# Patient Record
Sex: FEMALE | Race: WHITE | URBAN - METROPOLITAN AREA
[De-identification: names, ages, dates, MRNs, and addresses within clinical notes are randomized per-mention and may not be internally consistent; named-entity substitution may affect disease eponyms.]

---

## 2017-03-19 ENCOUNTER — APPOINTMENT (OUTPATIENT)
Dept: CT IMAGING | Facility: CLINIC | Age: 82
DRG: 760 | End: 2017-03-19
Attending: EMERGENCY MEDICINE
Payer: MEDICARE

## 2017-03-19 ENCOUNTER — HOSPITAL ENCOUNTER (INPATIENT)
Facility: CLINIC | Age: 82
LOS: 4 days | Discharge: HOME HEALTH | DRG: 760 | End: 2017-03-23
Attending: EMERGENCY MEDICINE | Admitting: INTERNAL MEDICINE
Payer: MEDICARE

## 2017-03-19 DIAGNOSIS — K43.9 VENTRAL HERNIA WITHOUT OBSTRUCTION OR GANGRENE: ICD-10-CM

## 2017-03-19 DIAGNOSIS — R21 RASH: Primary | ICD-10-CM

## 2017-03-19 DIAGNOSIS — N83.8 OVARIAN MASS: ICD-10-CM

## 2017-03-19 DIAGNOSIS — R10.31 ABDOMINAL PAIN, RIGHT LOWER QUADRANT: ICD-10-CM

## 2017-03-19 DIAGNOSIS — N64.9 BREAST LESION: ICD-10-CM

## 2017-03-19 DIAGNOSIS — R18.8 OTHER ASCITES: ICD-10-CM

## 2017-03-19 DIAGNOSIS — N63.0 BREAST MASS: ICD-10-CM

## 2017-03-19 DIAGNOSIS — N63.10 BREAST MASS, RIGHT: ICD-10-CM

## 2017-03-19 PROBLEM — R10.9 ABDOMINAL PAIN: Status: ACTIVE | Noted: 2017-03-19

## 2017-03-19 LAB
ALBUMIN SERPL-MCNC: 3.1 G/DL (ref 3.4–5)
ALBUMIN SERPL-MCNC: 3.3 G/DL (ref 3.4–5)
ALBUMIN UR-MCNC: 30 MG/DL
ALP SERPL-CCNC: 34 U/L (ref 40–150)
ALT SERPL W P-5'-P-CCNC: 13 U/L (ref 0–50)
ANION GAP SERPL CALCULATED.3IONS-SCNC: 10 MMOL/L (ref 3–14)
APPEARANCE UR: CLEAR
AST SERPL W P-5'-P-CCNC: 35 U/L (ref 0–45)
BASOPHILS # BLD AUTO: 0 10E9/L (ref 0–0.2)
BASOPHILS NFR BLD AUTO: 0 %
BILIRUB SERPL-MCNC: 0.8 MG/DL (ref 0.2–1.3)
BILIRUB UR QL STRIP: NEGATIVE
BUN SERPL-MCNC: 19 MG/DL (ref 7–30)
CALCIUM SERPL-MCNC: 9.6 MG/DL (ref 8.5–10.1)
CANCER AG125 SERPL-ACNC: 95 U/ML (ref 0–30)
CANCER AG27-29 SERPL-ACNC: 297 U/ML (ref 0–39)
CHLORIDE SERPL-SCNC: 103 MMOL/L (ref 94–109)
CO2 SERPL-SCNC: 27 MMOL/L (ref 20–32)
COLOR UR AUTO: YELLOW
CREAT SERPL-MCNC: 0.9 MG/DL (ref 0.52–1.04)
DIFFERENTIAL METHOD BLD: ABNORMAL
EOSINOPHIL # BLD AUTO: 0 10E9/L (ref 0–0.7)
EOSINOPHIL NFR BLD AUTO: 0 %
ERYTHROCYTE [DISTWIDTH] IN BLOOD BY AUTOMATED COUNT: 14.7 % (ref 10–15)
GFR SERPL CREATININE-BSD FRML MDRD: 60 ML/MIN/1.7M2
GLUCOSE SERPL-MCNC: 177 MG/DL (ref 70–99)
GLUCOSE UR STRIP-MCNC: NEGATIVE MG/DL
HCT VFR BLD AUTO: 40.2 % (ref 35–47)
HGB BLD-MCNC: 13.2 G/DL (ref 11.7–15.7)
HGB UR QL STRIP: NEGATIVE
IMM GRANULOCYTES # BLD: 0.1 10E9/L (ref 0–0.4)
IMM GRANULOCYTES NFR BLD: 0.3 %
KETONES UR STRIP-MCNC: 5 MG/DL
LEUKOCYTE ESTERASE UR QL STRIP: NEGATIVE
LIPASE SERPL-CCNC: 51 U/L (ref 73–393)
LYMPHOCYTES # BLD AUTO: 0.4 10E9/L (ref 0.8–5.3)
LYMPHOCYTES NFR BLD AUTO: 3 %
MCH RBC QN AUTO: 30.6 PG (ref 26.5–33)
MCHC RBC AUTO-ENTMCNC: 32.8 G/DL (ref 31.5–36.5)
MCV RBC AUTO: 93 FL (ref 78–100)
MONOCYTES # BLD AUTO: 0.2 10E9/L (ref 0–1.3)
MONOCYTES NFR BLD AUTO: 1.4 %
MUCOUS THREADS #/AREA URNS LPF: PRESENT /LPF
NEUTROPHILS # BLD AUTO: 13.7 10E9/L (ref 1.6–8.3)
NEUTROPHILS NFR BLD AUTO: 95.3 %
NITRATE UR QL: NEGATIVE
NRBC # BLD AUTO: 0 10*3/UL
NRBC BLD AUTO-RTO: 0 /100
PH UR STRIP: 6.5 PH (ref 5–7)
PLATELET # BLD AUTO: 239 10E9/L (ref 150–450)
POTASSIUM SERPL-SCNC: 3.7 MMOL/L (ref 3.4–5.3)
PROT SERPL-MCNC: 6.1 G/DL (ref 6.8–8.8)
RBC # BLD AUTO: 4.31 10E12/L (ref 3.8–5.2)
RBC #/AREA URNS AUTO: 2 /HPF (ref 0–2)
SODIUM SERPL-SCNC: 140 MMOL/L (ref 133–144)
SP GR UR STRIP: 1.04 (ref 1–1.03)
URN SPEC COLLECT METH UR: ABNORMAL
UROBILINOGEN UR STRIP-MCNC: NORMAL MG/DL (ref 0–2)
WBC # BLD AUTO: 14.4 10E9/L (ref 4–11)
WBC #/AREA URNS AUTO: 2 /HPF (ref 0–2)

## 2017-03-19 PROCEDURE — 96374 THER/PROPH/DIAG INJ IV PUSH: CPT

## 2017-03-19 PROCEDURE — 25000125 ZZHC RX 250: Performed by: EMERGENCY MEDICINE

## 2017-03-19 PROCEDURE — 25000128 H RX IP 250 OP 636: Performed by: INTERNAL MEDICINE

## 2017-03-19 PROCEDURE — 74177 CT ABD & PELVIS W/CONTRAST: CPT

## 2017-03-19 PROCEDURE — 25500064 ZZH RX 255 OP 636: Performed by: EMERGENCY MEDICINE

## 2017-03-19 PROCEDURE — 85025 COMPLETE CBC W/AUTO DIFF WBC: CPT | Performed by: EMERGENCY MEDICINE

## 2017-03-19 PROCEDURE — 82040 ASSAY OF SERUM ALBUMIN: CPT | Performed by: INTERNAL MEDICINE

## 2017-03-19 PROCEDURE — 99221 1ST HOSP IP/OBS SF/LOW 40: CPT | Mod: AI | Performed by: INTERNAL MEDICINE

## 2017-03-19 PROCEDURE — 12000007 ZZH R&B INTERMEDIATE

## 2017-03-19 PROCEDURE — 25000132 ZZH RX MED GY IP 250 OP 250 PS 637: Mod: GY | Performed by: INTERNAL MEDICINE

## 2017-03-19 PROCEDURE — 81001 URINALYSIS AUTO W/SCOPE: CPT | Performed by: INTERNAL MEDICINE

## 2017-03-19 PROCEDURE — 99285 EMERGENCY DEPT VISIT HI MDM: CPT | Mod: 25

## 2017-03-19 PROCEDURE — 36415 COLL VENOUS BLD VENIPUNCTURE: CPT | Performed by: INTERNAL MEDICINE

## 2017-03-19 PROCEDURE — 99207 ZZC CDG-HISTORY COMPONENT: MEETS DETAILED - DOWN CODED LACK OF PFSH: CPT | Performed by: INTERNAL MEDICINE

## 2017-03-19 PROCEDURE — 80053 COMPREHEN METABOLIC PANEL: CPT | Performed by: EMERGENCY MEDICINE

## 2017-03-19 PROCEDURE — 86300 IMMUNOASSAY TUMOR CA 15-3: CPT | Performed by: INTERNAL MEDICINE

## 2017-03-19 PROCEDURE — 83690 ASSAY OF LIPASE: CPT | Performed by: EMERGENCY MEDICINE

## 2017-03-19 PROCEDURE — 25000128 H RX IP 250 OP 636: Performed by: EMERGENCY MEDICINE

## 2017-03-19 PROCEDURE — 86304 IMMUNOASSAY TUMOR CA 125: CPT | Performed by: INTERNAL MEDICINE

## 2017-03-19 PROCEDURE — 96361 HYDRATE IV INFUSION ADD-ON: CPT

## 2017-03-19 PROCEDURE — 96375 TX/PRO/DX INJ NEW DRUG ADDON: CPT

## 2017-03-19 RX ORDER — IOPAMIDOL 755 MG/ML
121 INJECTION, SOLUTION INTRAVASCULAR ONCE
Status: COMPLETED | OUTPATIENT
Start: 2017-03-19 | End: 2017-03-19

## 2017-03-19 RX ORDER — HYDROCODONE BITARTRATE AND ACETAMINOPHEN 5; 325 MG/1; MG/1
1-2 TABLET ORAL EVERY 4 HOURS PRN
Status: DISCONTINUED | OUTPATIENT
Start: 2017-03-19 | End: 2017-03-23 | Stop reason: HOSPADM

## 2017-03-19 RX ORDER — FUROSEMIDE 10 MG/ML
20 INJECTION INTRAMUSCULAR; INTRAVENOUS ONCE
Status: COMPLETED | OUTPATIENT
Start: 2017-03-19 | End: 2017-03-19

## 2017-03-19 RX ORDER — ONDANSETRON 4 MG/1
4 TABLET, ORALLY DISINTEGRATING ORAL EVERY 6 HOURS PRN
Status: DISCONTINUED | OUTPATIENT
Start: 2017-03-19 | End: 2017-03-23 | Stop reason: HOSPADM

## 2017-03-19 RX ORDER — MULTIPLE VITAMINS W/ MINERALS TAB 9MG-400MCG
1 TAB ORAL DAILY
COMMUNITY

## 2017-03-19 RX ORDER — PROCHLORPERAZINE MALEATE 5 MG
5 TABLET ORAL EVERY 6 HOURS PRN
Status: DISCONTINUED | OUTPATIENT
Start: 2017-03-19 | End: 2017-03-23 | Stop reason: HOSPADM

## 2017-03-19 RX ORDER — ALBUMIN (HUMAN) 12.5 G/50ML
12.5 SOLUTION INTRAVENOUS ONCE
Status: DISCONTINUED | OUTPATIENT
Start: 2017-03-19 | End: 2017-03-19 | Stop reason: CLARIF

## 2017-03-19 RX ORDER — ACETAMINOPHEN 325 MG/1
650 TABLET ORAL EVERY 4 HOURS PRN
Status: DISCONTINUED | OUTPATIENT
Start: 2017-03-19 | End: 2017-03-23 | Stop reason: HOSPADM

## 2017-03-19 RX ORDER — PROCHLORPERAZINE 25 MG
12.5 SUPPOSITORY, RECTAL RECTAL EVERY 12 HOURS PRN
Status: DISCONTINUED | OUTPATIENT
Start: 2017-03-19 | End: 2017-03-23 | Stop reason: HOSPADM

## 2017-03-19 RX ORDER — HYDROMORPHONE HYDROCHLORIDE 1 MG/ML
.3-.5 INJECTION, SOLUTION INTRAMUSCULAR; INTRAVENOUS; SUBCUTANEOUS
Status: DISCONTINUED | OUTPATIENT
Start: 2017-03-19 | End: 2017-03-23 | Stop reason: HOSPADM

## 2017-03-19 RX ORDER — SODIUM CHLORIDE 9 MG/ML
1000 INJECTION, SOLUTION INTRAVENOUS CONTINUOUS
Status: DISCONTINUED | OUTPATIENT
Start: 2017-03-19 | End: 2017-03-19

## 2017-03-19 RX ORDER — ALBUMIN (HUMAN) 12.5 G/50ML
12.5 SOLUTION INTRAVENOUS ONCE
Status: CANCELLED | OUTPATIENT
Start: 2017-03-19 | End: 2017-03-19

## 2017-03-19 RX ORDER — ONDANSETRON 2 MG/ML
4 INJECTION INTRAMUSCULAR; INTRAVENOUS EVERY 6 HOURS PRN
Status: DISCONTINUED | OUTPATIENT
Start: 2017-03-19 | End: 2017-03-23 | Stop reason: HOSPADM

## 2017-03-19 RX ORDER — ONDANSETRON 2 MG/ML
4 INJECTION INTRAMUSCULAR; INTRAVENOUS ONCE
Status: COMPLETED | OUTPATIENT
Start: 2017-03-19 | End: 2017-03-19

## 2017-03-19 RX ORDER — LIDOCAINE 40 MG/G
CREAM TOPICAL
Status: DISCONTINUED | OUTPATIENT
Start: 2017-03-19 | End: 2017-03-19

## 2017-03-19 RX ORDER — NALOXONE HYDROCHLORIDE 0.4 MG/ML
.1-.4 INJECTION, SOLUTION INTRAMUSCULAR; INTRAVENOUS; SUBCUTANEOUS
Status: DISCONTINUED | OUTPATIENT
Start: 2017-03-19 | End: 2017-03-23 | Stop reason: HOSPADM

## 2017-03-19 RX ORDER — HYDROMORPHONE HYDROCHLORIDE 1 MG/ML
0.2 INJECTION, SOLUTION INTRAMUSCULAR; INTRAVENOUS; SUBCUTANEOUS
Status: COMPLETED | OUTPATIENT
Start: 2017-03-19 | End: 2017-03-19

## 2017-03-19 RX ADMIN — IOPAMIDOL 121 ML: 755 INJECTION, SOLUTION INTRAVENOUS at 12:45

## 2017-03-19 RX ADMIN — SODIUM CHLORIDE 77 ML: 9 INJECTION, SOLUTION INTRAVENOUS at 12:46

## 2017-03-19 RX ADMIN — FUROSEMIDE 20 MG: 10 INJECTION, SOLUTION INTRAVENOUS at 17:51

## 2017-03-19 RX ADMIN — MICONAZOLE NITRATE: 2 POWDER TOPICAL at 23:10

## 2017-03-19 RX ADMIN — ONDANSETRON 4 MG: 2 INJECTION INTRAMUSCULAR; INTRAVENOUS at 11:20

## 2017-03-19 RX ADMIN — SODIUM CHLORIDE 1000 ML: 9 INJECTION, SOLUTION INTRAVENOUS at 11:05

## 2017-03-19 RX ADMIN — HYDROMORPHONE HYDROCHLORIDE 0.2 MG: 1 INJECTION, SOLUTION INTRAMUSCULAR; INTRAVENOUS; SUBCUTANEOUS at 11:14

## 2017-03-19 ASSESSMENT — ACTIVITIES OF DAILY LIVING (ADL)
FALL_HISTORY_WITHIN_LAST_SIX_MONTHS: YES
TOILETING: 0-->INDEPENDENT
BATHING: 0-->INDEPENDENT
TRANSFERRING: 0-->INDEPENDENT
COGNITION: 0 - NO COGNITION ISSUES REPORTED
RETIRED_COMMUNICATION: 0-->UNDERSTANDS/COMMUNICATES WITHOUT DIFFICULTY
RETIRED_EATING: 0-->INDEPENDENT
SWALLOWING: 0-->SWALLOWS FOODS/LIQUIDS WITHOUT DIFFICULTY
DRESS: 0-->INDEPENDENT
AMBULATION: 1-->ASSISTIVE EQUIPMENT
NUMBER_OF_TIMES_PATIENT_HAS_FALLEN_WITHIN_LAST_SIX_MONTHS: 1
WHICH_OF_THE_ABOVE_FUNCTIONAL_RISKS_HAD_A_RECENT_ONSET_OR_CHANGE?: FALL HISTORY

## 2017-03-19 ASSESSMENT — ENCOUNTER SYMPTOMS
APPETITE CHANGE: 1
NAUSEA: 0
DIARRHEA: 1
SHORTNESS OF BREATH: 0
ABDOMINAL PAIN: 1
BLOOD IN STOOL: 0
VOMITING: 1

## 2017-03-19 NOTE — ED NOTES
Bed: ED05  Expected date:   Expected time:   Means of arrival:   Comments:  416  86 F abd pain/distention  1039

## 2017-03-19 NOTE — H&P
DATE OF ADMISSION:  03/19/2017      CHIEF COMPLAINT:  Abdominal pain.      HISTORY OF PRESENT ILLNESS:  Jovita Marshall is an 82-year-old white female with a history of hypertension, hypothyroidism, hyperlipidemia who presents with abdominal pain.  The patient states that she has noticed increased abdominal distention as well as lower extremity edema since January after her hip surgery.  However, last night the patient had gas pains in her abdomen which were quite severe.  She tried taking antacids accompanying the gas pains which were diffuse in nature.  She had nausea as well as had emesis x2 which was mainly of food particles.  She also had some loose stools twice which were nonbloody, however, her pain was persistent and hence she presented to the ER for further evaluation.  In the ER over here, the patient denies any chest pain.  She denies any shortness of breath.  She does endorse some lightheadedness.  Denies any fevers or chills.  She was seen by Dr. Cleveland and she had an elevated white cell count which prompted a CT of the abdomen and pelvis, which showed a large complex pelvic mass measuring 15 cm in greatest diameter.  The mass is primarily cystic but does have some lipid component and some calcifications.  It could be a large dermoid cyst, however, there is a large amount of ascites and based on the size, it is worrisome for an ovarian neoplasm.  I am asked to admit her for further evaluation.      PAST MEDICAL HISTORY:  Significant for hypertension, hyperlipidemia, hypothyroidism.      PAST SURGICAL HISTORY:  Significant for hysterectomy, preserving her ovaries in 1959.  She does have a history of breast cancer with right lumpectomy and radiation treatment, most recent mammogram was in 11/2016, the results of which are unknown.      FAMILY HISTORY:  Her mother had Dong's disease.      OUTPATIENT MEDICATIONS: Lasix, chlorthalidone, atenolol, Synthroid, and Zocor.      ALLERGIES:  No known drug allergies.       REVIEW OF SYSTEMS:  As mentioned in the HPI.  All other systems are extensively reviewed and deemed unremarkable and negative.      PHYSICAL EXAMINATION:   VITAL SIGNS:  Temperature is 97.9, respiratory rate is 16.  Blood pressure is 146/82.  O2 sat is 93% on room air.   GENERAL:  She is alert, awake, oriented, coherent, nontoxic, in no acute distress.  She is accompanied by her daughter.   HEENT:  Pupils equal, round, reactive to light.  Pharynx there is no exudate noted.  There is no tender anterior cervical lymphadenopathy.   LUNGS:  She has a few crackles in her bases bilaterally.   HEART:  Regular rate, S1, S2 normal.  She has a 3/6 systolic murmur.   ABDOMEN:  Soft, protuberant, diffusely tender, no guarding, no rigidity, with good bowel sounds.  She has an umbilical hernia in her epigastric region which is easily reducible.   EXTREMITIES:  She has 3+ edema bilaterally.   NEUROLOGIC:  Cranial nerves II-XII are grossly within normal limits.  She moves all her extremities.      LABORATORY DATA:  Labwork here shows a sodium 140, potassium 3.7, chloride 103, bicarbonate 27, BUN 19, creatinine 0.90, GFR 60, calcium 9.6, anion gap 10, albumin 3.1, total protein 6.1, total bilirubin 0.8.  Alkaline phosphatase 34.  LFTs are within normal limits.  Lipase is 51.  Glucose level of 177.  On a CBC:  White cell count is 14.4 with an absolute neutrophil count of 13.7.      IMAGING:  She had the following imaging studies:  CT of abdomen and pelvis with contrast which showed a large complex pelvic mass measuring 15 cm in greatest diameter.  The mass is primarily cystic but does have some lipid component and some calcifications.  This could be simply a large dermoid cyst, however, there is a large amount of ascites and based on the size of the mass the finding is worrisome for ovarian neoplasm stellate 2 cm lesion in the retroareolar region of the right breast, which is indeterminate, breast neoplasm cannot be excluded.   Diagnostic mammogram and ultrasound recommended for further evaluation.     Ventral hernia above the level of the umbilicus containing some omentum and some soft tissue thickening which could represent some focal inflammatory change.  There are low density lesions in both kidneys which are too small to characterize, most likely simple cyst.      ASSESSMENT AND PLAN:   1.  Abdominal pain, likely due to large complex pelvic mass.  Will admit her as an inpatient.  Will consult Interventional Radiology for paracentesis and send off the ascitic fluid for cytology.  In addition, will check tumor markers such as .  Will consult Oncology.   2.  Breast lesion on the right as on CT scan.  Will consult Oncology given her history of breast cancer.  Will hold off on further diagnostic at this point in time.    3.  Will place her on PCDs for DVT prophylaxis.      CODE STATUS:  Full code.      She will be admitted as an inpatient.         MATTHEW PEPE MD             D: 2017 15:15   T: 2017 15:46   MT: JOSE C      Name:     KATINA SORTO   MRN:      [id number]        Account:      [id number]   :      1934           Admitted:     [id number]      Document: [id number]

## 2017-03-19 NOTE — IP AVS SNAPSHOT
MRN:[id number]                      After Visit Summary   3/19/2017    Jovita Marshall    MRN: [id number]           Thank you!     Thank you for choosing [facility] for your care. Our goal is always to provide you with excellent care. Hearing back from our patients is one way we can continue to improve our services. Please take a few minutes to complete the written survey that you may receive in the mail after you visit with us. Thank you!        Patient Information     Date Of Birth          5/9/1934        About your hospital stay     You were admitted on:  March 19, 2017 You last received care in the:  [address] [facility]    You were discharged on:  March 23, 2017        Reason for your hospital stay       Abdominal pian                  Who to Call     For medical emergencies, please call 911.  For non-urgent questions about your medical care, please call your primary care provider or clinic, [phone]          Attending Provider     Provider Specialty    Pam Cleveland MD Emergency Medicine    ParpHeather damico MD Internal Medicine       Primary Care Provider Office Phone # Fax #    [facility] [phone]       [address]  [facility] [address]        After Care Instructions     Activity       Your activity upon discharge: activity as tolerated            Diet       Follow this diet upon discharge: Orders Placed This Encounter      2 Gram Sodium Diet                  Follow-up Appointments     Follow-up and recommended labs and tests        Follow up with primary care provider, [facility], within 7 days regarding new diagnosis.  No follow up labs or test are needed.  F/U with Gyn/Onc team a scheduled                  Your next 10 appointments already scheduled     Mar 30, 2017   Procedure with Vanessa Ruiz MD   [facility] (--)    [address]  [facility]  "[phone]   [phone]              Additional Services     Home Care PT Referral for Hospital Discharge       PT to eval and treat    Your provider has ordered home care - physical therapy. If you have not been contacted within 2 days of your discharge please call the department phone number listed on the top of this document.            Home care nursing referral       RN skilled nursing visit. RN to assess vital signs and weight and abdominal distension.    Your provider has ordered home care nursing services. If you have not been contacted within 2 days of your discharge please call the inpatient department phone number at [phone] .                  Further instructions from your care team       Abdominal pain    Home care arranged by:  [facility] [place]  Phone number #[phone]    Pending Results     Date and Time Order Name Status Description    3/19/2017 1644 Anaerobic bacterial culture Preliminary     3/19/2017 1644 Fluid Culture Aerobic Bacterial Preliminary             Statement of Approval     Ordered          03/23/17 1213  I have reviewed and agree with all the recommendations and orders detailed in this document.  EFFECTIVE NOW     Approved and electronically signed by:  Alex Amador MD             Admission Information     Date & Time Provider Department Dept. Phone    3/19/2017 Heather Bailey MD [place] [address] [facility] [phone]      Your Vitals Were     Blood Pressure Pulse Temperature Respirations Height Weight    142/93 (BP Location: Left arm) 91 97.7  F (36.5  C) (Oral) 14 1.549 m (5' 1\") 122 kg (268 lb 15.4 oz)    Pulse Oximetry BMI (Body Mass Index)                93% 50.82 kg/m2          [facility] Information     [facility] lets you send messages to your doctor, view your test results, renew your prescriptions, schedule appointments and more. To sign up, go to [email or web address] . Click on \"Log in\" on the left side of the screen, which will " "take you to the Welcome page. Then click on \"Sign up Now\" on the right side of the page.     You will be asked to enter the access code listed below, as well as some personal information. Please follow the directions to create your username and password.     Your access code is: [email or web address]  Expires: 2017 12:38 PM     Your access code will  in 90 days. If you need help or a new code, please call your [facility] clinic or [phone].        Care EveryWhere ID     This is your Care EveryWhere ID. This could be used by other organizations to access your [facility] medical records  [id number]           Review of your medicines      START taking        Dose / Directions    miconazole 2 % powder   Commonly known as:  MICATIN; MICRO GUARD   Used for:  Rash        Apply topically every hour as needed for other (topical candidiasis)   Quantity:  10 g   Refills:  0         CONTINUE these medicines which have NOT CHANGED        Dose / Directions    ATENOLOL PO        Dose:  50 mg   Take 50 mg by mouth every evening   Refills:  0       FUROSEMIDE PO        Dose:  20 mg   Take 20 mg by mouth 2 times daily Morning and Noon   Refills:  0       K-DUR PO        Dose:  20 mEq   Take 20 mEq by mouth daily   Refills:  0       LEVOTHYROXINE SODIUM PO        Dose:  75 mcg   Take 75 mcg by mouth daily   Refills:  0       Multi-vitamin Tabs tablet        Dose:  1 tablet   Take 1 tablet by mouth daily   Refills:  0       SIMVASTATIN PO        Dose:  20 mg   Take 20 mg by mouth every evening (Takes half of a 40mg tablet for a total of 20mg in the evening)   Refills:  0       TYLENOL PO        Dose:  1300 mg   Take 1,300 mg by mouth daily as needed for mild pain or fever   Refills:  0       VITAMIN D (CHOLECALCIFEROL) PO        Dose:  2000 Units   Take 2,000 Units by mouth daily   Refills:  0         STOP taking     ASPIRIN EC PO                Where to get your medicines      These medications were sent to [facility] [place] " - [place], [place] - [address]  [address] [place] [address]     Phone:  [phone]     miconazole 2 % powder                Protect others around you: Learn how to safely use, store and throw away your medicines at www.disposemymeds.org.             Medication List: This is a list of all your medications and when to take them. Check marks below indicate your daily home schedule. Keep this list as a reference.      Medications           Morning Afternoon Evening Bedtime As Needed    ATENOLOL PO   Take 50 mg by mouth every evening   Last time this was given:  None taken   Next Dose Due:  Resume                                FUROSEMIDE PO   Take 20 mg by mouth 2 times daily Morning and Noon   Last time this was given:  None   Next Dose Due:  Resume                                K-DUR PO   Take 20 mEq by mouth daily   Last time this was given:  None taken   Next Dose Due:  Resume                                LEVOTHYROXINE SODIUM PO   Take 75 mcg by mouth daily   Last time this was given:  75 mcg on 3/23/2017  8:49 AM   Next Dose Due:  Due Friday 8am                                miconazole 2 % powder   Commonly known as:  MICATIN; MICRO GUARD   Apply topically every hour as needed for other (topical candidiasis)   Last time this was given:  3/20/2017  1:10 PM   Next Dose Due:  Due anytime                                Multi-vitamin Tabs tablet   Take 1 tablet by mouth daily   Last time this was given:  None taken   Next Dose Due:  Resume                                SIMVASTATIN PO   Take 20 mg by mouth every evening (Takes half of a 40mg tablet for a total of 20mg in the evening)   Last time this was given:  20 mg on 3/22/2017  9:20 PM   Next Dose Due:  Due Thursday at bedtime                                TYLENOL PO   Take 1,300 mg by mouth daily as needed for mild pain or fever   Last time this was given:  None taken   Next Dose Due:  Resume                                VITAMIN D  (CHOLECALCIFEROL) PO   Take 2,000 Units by mouth daily   Last time this was given:  None taken   Next Dose Due:  Resume                                          More Information        Abdominal Pain  Abdominal pain is pain in the stomach or intestinal area. Everyone has this pain from time to time. In many cases it goes away on its own. But abdominal pain can sometimes be due to a serious problem, such as appendicitis. So it s important to know when to seek help.  Causes of abdominal pain  There are many possible causes of abdominal pain. Common causes in adults include:    Constipation, diarrhea, or gas    GERD (gastroesophageal reflux disease) movement of stomach acid into the esophagus, also known as acid reflux or heartburn    Peptic ulcer (a sore in the lining of the stomach or small intestine)    Inflammation of the gallbladder, liver, or pancreas    Gallstones or kidney stones    Appendicitis     Obstruction of the intestines     Hernia (bulging of an internal organ through a muscle or other tissue)    Urinary tract infections    In women, menstrual cramps, fibroids, or endometriosis of the uterus    Inflammation or infection of the intestines  Diagnosing the cause of abdominal pain  Your health care provider will examine you to help find the cause of your pain. If needed, tests will be ordered. Because abdominal pain has so many possible causes, it can be hard to discover the reason for the pain. Giving details about your pain can help. Be ready to tell your health care provider where and when you feel the pain and what makes it better or worse. Also mention whether you have other symptoms such as fever, tiredness, nausea, vomiting, or changes in bathroom habits.  Treating abdominal pain  Certain causes of pain, such as appendicitis or a bowel obstruction, need emergency treatment. Other problems can be treated with rest, fluids, or medications. Your health care provider can give you specific instructions  for treatment or self-care based on the cause of your pain.  If you have vomiting or diarrhea, sip water or other clear fluids. When you are ready to eat solid foods again, start with small amounts of easy-to-digest, low-fat foods, such as applesauce, toast, or crackers.   When to call the doctor  Call 911 or go to the hospital right away if you:    Can t pass stool and are vomiting    Are vomiting blood or have black, tarry diarrhea    Also have chest, neck, or shoulder pain    Feel like you are about to pass out    Have pain in your shoulder blades with nausea    Have sudden, excruciating abdominal pain    Have new, severe pain unlike any you have felt before    Have a belly that is rigid, hard, and tender to touch  Call your doctor if you have:    Pain for more than 5 days    Bloating for more than 2 days    Diarrhea for more than 5 days    Fever of 101 F (38.3 C) or higher    Pain that continues to worsen    Unexplained weight loss    Continued lack of appetite    Blood in the stool  How to prevent abdominal pain  Here are some tips to help prevent abdominal pain:    Eat smaller amounts of food at one time.    Avoid greasy, fried, or other high-fat foods.    Avoid foods that give you gas.    Exercise regularly.    Drink plenty of fluids.  To help prevent symptoms of gastroesophageal reflux disease (GERD):    Quit smoking.    Reduce alcohol and certain foods that increase stomach acid.     Lose excess weight.    Finish eating at least 2 hours before you go to bed or lie down.    Elevate the head of your bed.    1603-6802 [employer]. [address] [place] [address], [place] [address]. All rights reserved. This information is not intended as a substitute for professional medical care. Always follow your healthcare professional's instructions.

## 2017-03-19 NOTE — ED NOTES
[facility]  ED Nurse Handoff Report    ED Chief complaint: Abdominal Pain and Nausea, Vomiting, & Diarrhea      ED Diagnosis:   Final diagnoses:   Ovarian mass - Suspicious for neoplasm   Abdominal pain, right lower quadrant   Other ascites   Ventral hernia without obstruction or gangrene   Breast lesion - 2 cm stellate lesion on CT right breast       Code Status: Not addressed by ED physician    Allergies: No Known Allergies    Activity level:  Patient has not gotten out of bed while in ED.     Needed?: No    Isolation: No  Infection: Not Applicable    Bariatric?: No      Vital Signs:   Vitals:    03/19/17 1107 03/19/17 1108 03/19/17 1119 03/19/17 1145   BP: 146/82      Resp:  16     Temp:  97.9  F (36.6  C)     TempSrc:  Oral     SpO2: 99%  93% 93%       Cardiac Rhythm: ,        Pain level:      Is this patient confused?: No    Patient Report: Initial Complaint: Abdominal pain with nausea and vomiting.  Focused Assessment: Patient comes in with complaints of abdominal pain since last night. States nausea and vomiting since this morning. Has a history of a mass in her abdomen for the past few months.  CT scan shows new diagnosis of ovarian cancer.   Tests Performed: See EPIC  Abnormal Results: See EPIC  Treatments provided: See [facility]    Family Comments: At bedside    OBS brochure/video discussed/provided to patient: No    ED Medications:   Medications   lidocaine 1 % 1 mL (not administered)   lidocaine (LMX4) cream (not administered)   sodium chloride (PF) 0.9% PF flush 3 mL (not administered)   sodium chloride (PF) 0.9% PF flush 3 mL (3 mLs Intracatheter Not Given 3/19/17 1135)   0.9% sodium chloride BOLUS (0 mLs Intravenous Stopped 3/19/17 1239)     Followed by   0.9% sodium chloride infusion (not administered)   iohexol (OMNIPAQUE) solution 25 mL (25 mLs Oral Given 3/19/17 1154)   HYDROmorphone (PF) (DILAUDID) injection 0.2 mg (0.2 mg Intravenous Given 3/19/17 1114)   ondansetron  (ZOFRAN) injection 4 mg (4 mg Intravenous Given 3/19/17 1120)   sodium chloride 0.9 % for CT scan flush dose 77 mL (77 mLs Intravenous Given 3/19/17 1246)   iopamidol (ISOVUE-370) solution 121 mL (121 mLs Intravenous Given 3/19/17 1245)       Drips infusing?:  No      ED NURSE PHONE NUMBER: [phone]

## 2017-03-19 NOTE — PHARMACY-ADMISSION MEDICATION HISTORY
Admission medication history interview status for the 3/19/2017  admission is complete. See EPIC admission navigator for prior to admission medications     Medication history source reliability:Good    Actions taken by pharmacist (provider contacted, etc):Verified all medications with patient's records from [facility] CareEverywhere and written list on phone     Additional medication history information not noted on PTA med list :None    Medication reconciliation/reorder completed by provider prior to medication history? No    Time spent in this activity: 20 minutes    Prior to Admission medications    Medication Sig Last Dose Taking? Auth Provider   ATENOLOL PO Take 50 mg by mouth every evening 3/18/2017 at pm Yes Unknown, Entered By History   VITAMIN D, CHOLECALCIFEROL, PO Take 2,000 Units by mouth daily 3/18/2017 at Unknown time Yes Unknown, Entered By History   multivitamin, therapeutic with minerals (MULTI-VITAMIN) TABS tablet Take 1 tablet by mouth daily 3/18/2017 at Unknown time Yes Unknown, Entered By History   SIMVASTATIN PO Take 20 mg by mouth every evening (Takes half of a 40mg tablet for a total of 20mg in the evening) 3/18/2017 at pm Yes Unknown, Entered By History   Potassium Chloride Obdulia CR (K-DUR PO) Take 20 mEq by mouth daily 3/18/2017 at am Yes Unknown, Entered By History   LEVOTHYROXINE SODIUM PO Take 75 mcg by mouth daily 3/18/2017 at am Yes Unknown, Entered By History   FUROSEMIDE PO Take 20 mg by mouth 2 times daily Morning and Noon 3/18/2017 at 1200 Yes Unknown, Entered By History   ASPIRIN EC PO Take 162 mg by mouth daily 3/18/2017 at am Yes Unknown, Entered By History   Acetaminophen (TYLENOL PO) Take 1,300 mg by mouth daily as needed for mild pain or fever 3/17/2017 Yes Unknown, Entered By History

## 2017-03-19 NOTE — IP AVS SNAPSHOT
[address] [facility]    [address] [place] [address]    Phone:  [phone]                                       After Visit Summary   3/19/2017    Jovita Marshall    MRN: [id number]           After Visit Summary Signature Page     I have received my discharge instructions, and my questions have been answered. I have discussed any challenges I see with this plan with the nurse or doctor.    ..........................................................................................................................................  Patient/Patient Representative Signature      ..........................................................................................................................................  Patient Representative Print Name and Relationship to Patient    ..................................................               ................................................  Date                                            Time    ..........................................................................................................................................  Reviewed by Signature/Title    ...................................................              ..............................................  Date                                                            Time

## 2017-03-19 NOTE — ED PROVIDER NOTES
"  History     Chief Complaint:  Abdominal pain     HPI   Information supplemented by EMS.     Jovita Marshall is a 82 year old female who presents with abdominal pain via EMS. The patient has had a mass in her left lower quadrant for the past number of months. Beginning last night at 2300, she describes experiencing \"gas pain\" in her lower left abdomen. Since this time, she has had three episodes of vomiting as well as persistent abdominal pain, which is alleviated when supine. The patient has not eaten anything today, but did take Pepto bismol. Her daughter called EMS today due to the persistence of the patient's pain. Upon evaluation, the patient reports multiple episodes of diarrhea. She denies any nausea, chest pain, shortness of breath, or any blood in her stool. She denies a history of c.diff or any recent antibiotics.     Of note, the patient had a hip arthoplasty performed in January, after which she has had persistent bilateral lower extremity edema.     Allergies:  NKDA     Medications:    The patient is currently on no regular medications.     Past Medical History:    The patient denies any significant past medical history.    Past Surgical History:    The patient does not have any pertinent past surgical history.    Family History:    No past pertinent family history.    Social History:  Lives with daughter and .   Marital Status:       Review of Systems   Constitutional: Positive for appetite change.   Respiratory: Negative for shortness of breath.    Cardiovascular: Negative for chest pain.   Gastrointestinal: Positive for abdominal pain (left lower), diarrhea and vomiting. Negative for blood in stool and nausea.   All other systems reviewed and are negative.    Physical Exam     Patient Vitals for the past 24 hrs:   BP Temp Temp src Resp SpO2   03/19/17 1145 - - - - 93 %   03/19/17 1119 - - - - 93 %   03/19/17 1108 - 97.9  F (36.6  C) Oral 16 -   03/19/17 1107 146/82 - - - 99 %     Physical " Exam  Nursing note and vitals reviewed.  Constitutional:  Obese elderly  female.   HENT:   Head:   No evidence of facial or scalp trauma.  Nose:    Nose normal.   Mouth/Throat:  Mucosa is moist.  Eyes:    Conjunctivae are normal.      Pupils are equal, round, and reactive to light.      Right eye exhibits no discharge. Left eye exhibits no discharge.      No scleral icterus.   Cardiovascular:  Normal rate, regular rhythm.      Normal heart sounds and intact distal pulses.       No murmur heard. Chronic 2+ lower extremity edema.   Pulmonary/Chest:  Effort normal and breath sounds normal. No respiratory distress.     No wheezes. No rales. No chest wall tenderness. No stridor.   Abdominal:   Soft. No distension and no mass. Diffuse tenderness with guarding.      No rebound. No flank pain. Ventral hernia that is tender, but reducible.   Musculoskeletal:  Normal range of motion.      No edema and no tenderness.                                       Neck supple, no midline cervical tenderness.   Neurological:   Alert and oriented to person, place, and year.      No cranial nerve deficit.      Exhibits normal muscle tone. Coordination normal.      GCS eye subscore is 4. GCS verbal subscore is 5.      GCS motor subscore is 6.   Skin:    Skin is warm and dry. No rash noted. No diaphoresis.      No erythema. No pallor.   Psychiatric:   Behavior is normal. Judgment and thought content normal.     Emergency Department Course   Imaging:  Radiographic findings were communicated with the patient who voiced understanding of the findings.    CT abdomen/pelvis w/ contrast:  1. Large complex pelvic mass measuring 15 cm in greatest diameter. The  mass is primarily cystic but does have some lipid component and some  calcifications.. It could be simply a large dermoid cyst, however  there is a large amount of ascites and based on the size of the mass  the finding is worrisome for an ovarian neoplasm.  2.  stellate 2 cm lesion in  the retroareolar region of the right  breast which is in determinant. Breast neoplasm cannot be excluded.  Diagnostic mammogram and ultrasound recommended for further  evaluation.  3. Ventral hernia above the level of the umbilicus containing some  omentum and some soft tissue thickening which could represent some  focal inflammatory change.  4... There are low-density lesions in both kidneys which are too small  to characterize, most likely simple cysts. As per radiology.     Laboratory:  CBC: WBC: 14.4 (H), HGB: 13.2, PLT: 239  CMP: Glucose 177 (H), GFR estimate 60 (L), Albumin 3.1 (L), Protein total 6.1 (L), Alkphos 34 (L), o/w WNL (Creatinine: 0.90)    Lipase: 51    Interventions:  1105 NS 1L IV   1114 Dilaudid 0.2 mg IV  1120 Zofran 4 mg IV    Emergency Department Course:  Nursing notes and vitals reviewed.  I performed an exam of the patient as documented above.     IV inserted. Medicine administered as documented above.     Blood drawn. This was sent to the lab for further testing, results above.    The patient was sent for a abdomen/pelvis CT while in the emergency department, findings above.     1416 I reevaluated the patient and provided an update in regards to her ED course.  I discussed the patient's CT findings, and admission to oncology for further observation.     1516 I consulted with Dr. Bailey, hospitalist, who agreed to admit the patient.      Findings and plan explained to the Patient who consents to admission. Discussed the patient with Dr. Bailey, who will admit the patient to a oncology bed for further monitoring, evaluation, and treatment.    Impression & Plan      Medical Decision Making:  Jovita Marshall is a 82 year old female who presents with abdominal pain. Her WBC [place] elevated at [address] with a left shift. CMP was unremarkable. The patient was unable to give a urine sample initially. I did obtain a CT of the abdomen/pelvis which unfortunately reveals a large complex pelvic mass measuring 15  cm that is suspicious for a ovarian neoplasm. She also has a 2 cm stellate lesion behind her right nipple which could be concerning for cancer. She has a history of breast cancer and had a lumpectomy in the past. She has a lot of ascites. I went in and gave the patient the news of her findings, and will admit her to the Dr. Bailey. I will get oncology consultation. She is comfortable at this time. She did get 0.2 mg of dilaudid for her pain, which did help greatly. She will continue to get IV fluids, and I will admit her to oncology.    Diagnosis:    ICD-10-CM    1. Ovarian mass N83.9     Suspicious for neoplasm   2. Abdominal pain, right lower quadrant R10.31    3. Other ascites R18.8    4. Ventral hernia without obstruction or gangrene K43.9    5. Breast lesion N64.9     2 cm stellate lesion on CT right breast       Plan: Admit to Dr Bailey  , Oncology consult. She probably will need a follow up mammogram for the breast leasion.       I, Del Rivera, am serving as a scribe on 3/19/2017 at 11:00 AM to personally document services performed by Pam Cleveland MD based on my observations and the provider's statements to me.     Del Rivera  3/19/2017    EMERGENCY DEPARTMENT       Pam Cleveland MD  03/19/17 [id number]

## 2017-03-20 ENCOUNTER — APPOINTMENT (OUTPATIENT)
Dept: ULTRASOUND IMAGING | Facility: CLINIC | Age: 82
DRG: 760 | End: 2017-03-20
Attending: INTERNAL MEDICINE
Payer: MEDICARE

## 2017-03-20 ENCOUNTER — APPOINTMENT (OUTPATIENT)
Dept: ULTRASOUND IMAGING | Facility: CLINIC | Age: 82
DRG: 760 | End: 2017-03-20
Attending: NURSE PRACTITIONER
Payer: MEDICARE

## 2017-03-20 LAB
ALBUMIN FLD-MCNC: 2.3 G/DL
ALBUMIN SERPL-MCNC: 2.7 G/DL (ref 3.4–5)
ALP SERPL-CCNC: 38 U/L (ref 40–150)
ALT SERPL W P-5'-P-CCNC: 9 U/L (ref 0–50)
ANION GAP SERPL CALCULATED.3IONS-SCNC: 8 MMOL/L (ref 3–14)
APPEARANCE FLD: NORMAL
AST SERPL W P-5'-P-CCNC: 29 U/L (ref 0–45)
BASOPHILS # BLD AUTO: 0 10E9/L (ref 0–0.2)
BASOPHILS NFR BLD AUTO: 0.1 %
BILIRUB SERPL-MCNC: 0.6 MG/DL (ref 0.2–1.3)
BUN SERPL-MCNC: 25 MG/DL (ref 7–30)
CALCIUM SERPL-MCNC: 10.2 MG/DL (ref 8.5–10.1)
CHLORIDE SERPL-SCNC: 102 MMOL/L (ref 94–109)
CO2 SERPL-SCNC: 30 MMOL/L (ref 20–32)
COLOR FLD: NORMAL
CREAT SERPL-MCNC: 1.11 MG/DL (ref 0.52–1.04)
DIFFERENTIAL METHOD BLD: ABNORMAL
EOSINOPHIL # BLD AUTO: 0 10E9/L (ref 0–0.7)
EOSINOPHIL NFR BLD AUTO: 0 %
EOSINOPHIL NFR FLD MANUAL: 2 %
ERYTHROCYTE [DISTWIDTH] IN BLOOD BY AUTOMATED COUNT: 15.1 % (ref 10–15)
GFR SERPL CREATININE-BSD FRML MDRD: 47 ML/MIN/1.7M2
GLUCOSE SERPL-MCNC: 132 MG/DL (ref 70–99)
GRAM STN SPEC: NORMAL
HCT VFR BLD AUTO: 41.6 % (ref 35–47)
HGB BLD-MCNC: 13.6 G/DL (ref 11.7–15.7)
IMM GRANULOCYTES # BLD: 0.1 10E9/L (ref 0–0.4)
IMM GRANULOCYTES NFR BLD: 0.4 %
INR PPP: 1.29 (ref 0.86–1.14)
LYMPHOCYTES # BLD AUTO: 0.7 10E9/L (ref 0.8–5.3)
LYMPHOCYTES NFR BLD AUTO: 3.8 %
LYMPHOCYTES NFR FLD MANUAL: 2 %
MCH RBC QN AUTO: 30.2 PG (ref 26.5–33)
MCHC RBC AUTO-ENTMCNC: 32.7 G/DL (ref 31.5–36.5)
MCV RBC AUTO: 92 FL (ref 78–100)
MICRO REPORT STATUS: NORMAL
MONOCYTES # BLD AUTO: 0.5 10E9/L (ref 0–1.3)
MONOCYTES NFR BLD AUTO: 2.7 %
MONOS+MACROS NFR FLD MANUAL: 1 %
NEUTROPHILS # BLD AUTO: 17.8 10E9/L (ref 1.6–8.3)
NEUTROPHILS NFR BLD AUTO: 93 %
NEUTS BAND NFR FLD MANUAL: 95 %
NRBC # BLD AUTO: 0 10*3/UL
NRBC BLD AUTO-RTO: 0 /100
PLATELET # BLD AUTO: 260 10E9/L (ref 150–450)
POTASSIUM SERPL-SCNC: 4 MMOL/L (ref 3.4–5.3)
PROT FLD-MCNC: 3.7 G/DL
PROT SERPL-MCNC: 5.8 G/DL (ref 6.8–8.8)
RBC # BLD AUTO: 4.5 10E12/L (ref 3.8–5.2)
SODIUM SERPL-SCNC: 140 MMOL/L (ref 133–144)
SPECIMEN SOURCE FLD: NORMAL
SPECIMEN SOURCE: NORMAL
WBC # BLD AUTO: 19.1 10E9/L (ref 4–11)
WBC # FLD AUTO: NORMAL /UL

## 2017-03-20 PROCEDURE — 87205 SMEAR GRAM STAIN: CPT | Performed by: INTERNAL MEDICINE

## 2017-03-20 PROCEDURE — 25000132 ZZH RX MED GY IP 250 OP 250 PS 637: Mod: GY | Performed by: INTERNAL MEDICINE

## 2017-03-20 PROCEDURE — 80053 COMPREHEN METABOLIC PANEL: CPT | Performed by: INTERNAL MEDICINE

## 2017-03-20 PROCEDURE — 12000007 ZZH R&B INTERMEDIATE

## 2017-03-20 PROCEDURE — 88305 TISSUE EXAM BY PATHOLOGIST: CPT | Mod: 26 | Performed by: INTERNAL MEDICINE

## 2017-03-20 PROCEDURE — 00000155 ZZHCL STATISTIC H-CELL BLOCK W/STAIN: Performed by: INTERNAL MEDICINE

## 2017-03-20 PROCEDURE — 25000128 H RX IP 250 OP 636: Performed by: INTERNAL MEDICINE

## 2017-03-20 PROCEDURE — A9270 NON-COVERED ITEM OR SERVICE: HCPCS | Mod: GY | Performed by: INTERNAL MEDICINE

## 2017-03-20 PROCEDURE — 82042 OTHER SOURCE ALBUMIN QUAN EA: CPT | Performed by: INTERNAL MEDICINE

## 2017-03-20 PROCEDURE — 85610 PROTHROMBIN TIME: CPT | Performed by: INTERNAL MEDICINE

## 2017-03-20 PROCEDURE — 88305 TISSUE EXAM BY PATHOLOGIST: CPT | Performed by: INTERNAL MEDICINE

## 2017-03-20 PROCEDURE — 0W9G3ZZ DRAINAGE OF PERITONEAL CAVITY, PERCUTANEOUS APPROACH: ICD-10-PCS | Performed by: RADIOLOGY

## 2017-03-20 PROCEDURE — 36415 COLL VENOUS BLD VENIPUNCTURE: CPT | Performed by: INTERNAL MEDICINE

## 2017-03-20 PROCEDURE — 87070 CULTURE OTHR SPECIMN AEROBIC: CPT | Performed by: INTERNAL MEDICINE

## 2017-03-20 PROCEDURE — 85025 COMPLETE CBC W/AUTO DIFF WBC: CPT | Performed by: INTERNAL MEDICINE

## 2017-03-20 PROCEDURE — 99233 SBSQ HOSP IP/OBS HIGH 50: CPT | Performed by: INTERNAL MEDICINE

## 2017-03-20 PROCEDURE — 88112 CYTOPATH CELL ENHANCE TECH: CPT | Performed by: INTERNAL MEDICINE

## 2017-03-20 PROCEDURE — 87075 CULTR BACTERIA EXCEPT BLOOD: CPT | Performed by: INTERNAL MEDICINE

## 2017-03-20 PROCEDURE — 76830 TRANSVAGINAL US NON-OB: CPT

## 2017-03-20 PROCEDURE — 84157 ASSAY OF PROTEIN OTHER: CPT | Performed by: INTERNAL MEDICINE

## 2017-03-20 PROCEDURE — 89051 BODY FLUID CELL COUNT: CPT | Performed by: INTERNAL MEDICINE

## 2017-03-20 PROCEDURE — 40000863 ZZH STATISTIC RADIOLOGY XRAY, US, CT, MAR, NM

## 2017-03-20 PROCEDURE — 27210190 US PARACENTESIS

## 2017-03-20 PROCEDURE — 88112 CYTOPATH CELL ENHANCE TECH: CPT | Mod: 26 | Performed by: INTERNAL MEDICINE

## 2017-03-20 RX ORDER — CEFTRIAXONE 2 G/1
2 INJECTION, POWDER, FOR SOLUTION INTRAMUSCULAR; INTRAVENOUS EVERY 24 HOURS
Status: DISCONTINUED | OUTPATIENT
Start: 2017-03-21 | End: 2017-03-20

## 2017-03-20 RX ORDER — CEFTRIAXONE 2 G/1
2 INJECTION, POWDER, FOR SOLUTION INTRAMUSCULAR; INTRAVENOUS EVERY 24 HOURS
Status: DISCONTINUED | OUTPATIENT
Start: 2017-03-20 | End: 2017-03-23 | Stop reason: HOSPADM

## 2017-03-20 RX ORDER — LIDOCAINE HYDROCHLORIDE 10 MG/ML
10 INJECTION, SOLUTION EPIDURAL; INFILTRATION; INTRACAUDAL; PERINEURAL ONCE
Status: COMPLETED | OUTPATIENT
Start: 2017-03-20 | End: 2017-03-20

## 2017-03-20 RX ORDER — CEFTRIAXONE 1 G/1
1 INJECTION, POWDER, FOR SOLUTION INTRAMUSCULAR; INTRAVENOUS EVERY 24 HOURS
Status: DISCONTINUED | OUTPATIENT
Start: 2017-03-20 | End: 2017-03-20

## 2017-03-20 RX ORDER — SIMVASTATIN 20 MG
20 TABLET ORAL EVERY EVENING
Status: DISCONTINUED | OUTPATIENT
Start: 2017-03-20 | End: 2017-03-23 | Stop reason: HOSPADM

## 2017-03-20 RX ORDER — FUROSEMIDE 10 MG/ML
20 INJECTION INTRAMUSCULAR; INTRAVENOUS ONCE
Status: COMPLETED | OUTPATIENT
Start: 2017-03-20 | End: 2017-03-20

## 2017-03-20 RX ORDER — LEVOTHYROXINE SODIUM 75 UG/1
75 TABLET ORAL DAILY
Status: DISCONTINUED | OUTPATIENT
Start: 2017-03-20 | End: 2017-03-23 | Stop reason: HOSPADM

## 2017-03-20 RX ADMIN — CEFTRIAXONE 2 G: 2 INJECTION, POWDER, FOR SOLUTION INTRAMUSCULAR; INTRAVENOUS at 18:05

## 2017-03-20 RX ADMIN — FUROSEMIDE 20 MG: 10 INJECTION, SOLUTION INTRAVENOUS at 16:13

## 2017-03-20 RX ADMIN — LIDOCAINE HYDROCHLORIDE 100 MG: 10 INJECTION, SOLUTION EPIDURAL; INFILTRATION; INTRACAUDAL; PERINEURAL at 11:23

## 2017-03-20 RX ADMIN — SIMVASTATIN 20 MG: 20 TABLET, FILM COATED ORAL at 19:42

## 2017-03-20 RX ADMIN — MICONAZOLE NITRATE: 2 POWDER TOPICAL at 13:10

## 2017-03-20 RX ADMIN — LEVOTHYROXINE SODIUM 75 MCG: 75 TABLET ORAL at 09:45

## 2017-03-20 NOTE — PLAN OF CARE
Problem: Goal Outcome Summary  Goal: Goal Outcome Summary  Outcome: No Change  10:45 patient on cart to ultrasound

## 2017-03-20 NOTE — CONSULTS
GYN/ONC CONSULT  Patient Name: Jovita Marshall  Address: [address]  [facility] [address]  Age:82 year old, : 1934   Sex: female   Admission Date/Time: 3/19/2017 10:53 AM   CC: I was asked to see Ms. Jovita Marshall by Dr. Bailey in consultation for a large cystic mass found on CT.    HPI:   The patient is 82 year old female with a history of breast cancer admitted on 3/19/2017 with abdominal pain. The patient notes abdominal pain beginning in January and progressively worsening overtime. She notes sudden onset of intense gas pains, nausea, and diarrhea provoking her visit to the ER. She also notes bilateral lower extremity swelling since a total left hip replacement done in 2017 at [facility]. She notes she did have doppler studies per her PCP which were negative. She also notes a couple month history of early satiety. She denies fevers or chills. She is urinating without difficulty.  During workup, WBC was elevated at 14, further today at 19. This prompted a CT scan which demonstrates a large, complex, 15cm pelvic, primarily cystic mass. It does have some lipid component and some calcifications which could be a dermoid. There is also a large amount of ascites. There is also a stellate 2cm lesion in the retroareolar region of the right breast which is indeterminate. Ventral hernia above the level of the umbilicus containing some omentum and some soft tissue thickening. Low density lesions in both kidneys, too small to characterize.  is slightly elevated at 95 U/mL and CA 27-29 is 297.  Per the patient, she has a previous history of a hysterectomy however the ovaries were left in. She underwent a right lumpectomy in . She states her last mammogram was  and negative. Her treating oncologist is Dr. Bridges.     REVIEW OF SYSTEMS  14 point ROS obtained and negative aside from that mentioned in the HPI.    PAST MEDICAL HISTORY  No past medical history on file.   PAST SURGICAL  HISTORY  No past surgical history on file.    CURRENT MEDS  Current Facility-Administered Medications   Medication     sodium chloride (PF) 0.9% PF flush 3 mL     sodium chloride (PF) 0.9% PF flush 3 mL     levothyroxine (SYNTHROID/LEVOTHROID) tablet 75 mcg     simvastatin (ZOCOR) tablet 20 mg     naloxone (NARCAN) injection 0.1-0.4 mg     acetaminophen (TYLENOL) tablet 650 mg     HYDROmorphone (PF) (DILAUDID) injection 0.3-0.5 mg     HYDROcodone-acetaminophen (NORCO) 5-325 MG per tablet 1-2 tablet     ondansetron (ZOFRAN-ODT) ODT tab 4 mg    Or     ondansetron (ZOFRAN) injection 4 mg     prochlorperazine (COMPAZINE) injection 5 mg    Or     prochlorperazine (COMPAZINE) tablet 5 mg    Or     prochlorperazine (COMPAZINE) Suppository 12.5 mg     miconazole (MICATIN; MICRO GUARD) 2 % powder     ALLERGIES/SENSITIVITIES  No Known Allergies  FAMILY HISTORY  No family history on file.  SOCIAL HISTORY  Social History     Social History     Marital status:      Spouse name: N/A     Number of children: N/A     Years of education: N/A     Occupational History     Not on file.     Social History Main Topics     Smoking status: Not on file     Smokeless tobacco: Not on file     Alcohol use Not on file     Drug use: Not on file     Sexual activity: Not on file     Other Topics Concern     Not on file     Social History Narrative      PHYSICAL EXAM  /71 (BP Location: Left arm)  Pulse 83  Temp 97.5  F (36.4  C) (Oral)  Resp 16  Wt 122 kg (268 lb 15.4 oz)  SpO2 90%  There is no height or weight on file to calculate BMI.    GENERAL: appears healthy, NAD    HEENT: normocephalic, no scleral icterus, PERRL, mucosa moist  NECK: supple, no LAD  BREAST: no mass palpated on exam, lichen sclerosis on posterior portion of nipple  CV: RRR, no murmurs  LUNGS:  No dyspnea, clear bilat  ABDOMEN: large ventral hernia above the umbilicus, soft, non-tender  EXTREMITIES:  3+ BLE edema, no clubbing, no cyanosis, no deformities  SKIN: warm  and dry, no jaundice, no rashes  NEURO:  Cranial nerves II-XII grossly intact, alert and oriented, motor exam intact   PSYCH: appropriate mood and affect    LABS  Recent Labs   Lab Test  03/20/17   0822 03/19/17   1108   WBC  19.1*  14.4*   RBC  4.50  4.31   HGB  13.6  13.2   HCT  41.6  40.2   MCV  92  93   MCH  30.2  30.6   MCHC  32.7  32.8   PLT  260  239     Recent Labs   Lab Test  03/20/17   0822 03/19/17   1108   POTASSIUM  4.0  3.7   CHLORIDE  102  103   BUN  25  19       Recent Labs   Lab Test  03/20/17 0822 03/19/17 2035 03/19/17   1108   PROTEIN   --   30*   --    BILITOTAL  0.6   --   0.8   AST  29   --   35   ALT  9   --   13     INR (no units)   Date Value   03/20/2017 1.29 (H)         IMAGING  CT as above  US pending      ASSESSMENT AND PLAN :   A pleasant 82 year old with a history of ER/VA+ right breast DCIS who presents with abdomimal pain and is found to have a large pelvic mass.    1. Abdominal pain associated with a large, 15cm pelvic mass and ascites. This could likely be a dermoid cyst that torsed.  Dermoids are not associated with ascites and the mass is not solid and therefore likely not a fibroid with associated Meig's syndrom.   At her age it could also be malignant.  We will further evaluate with an US and await pathology from paracentesis. We discussed surgical removal of this mass either robotically or with a laparotomy depending on pathology results. This can be done as an outpatient procedure.   -elevated CA 27-29 = 297 and = 95   2.  Right breast mass  -She will likely need a biopsy of the right breast lesion as well. Patient seen by Dr. Brandon who recommends US and biopsy.   Patient seen and evaluated with Dr. Ruiz  3. Leukocytosis: this could be reactive. We recommend monitoring at this time.    Debbie Brown, MARLEY  GYN ONC    Patient seen and examined.  I agree with assessment and plan as outlined.  Will await cytology to determine management of mass    A. Malka Ruiz,  MBRAULIO  [phone]

## 2017-03-20 NOTE — PLAN OF CARE
Problem: Goal Outcome Summary  Goal: Goal Outcome Summary  Outcome: No Change  VSS, A&Ox4. Minimal pain when not moving, discomfort with movement but declines pain medication. LS clear, heart murmur. BS active, passing flatus. Irregular abdominal contour. +3 edema BLEs. Reddened area under panis of abdomen - nystatin & intradry in place. Generalized weakness. Repositioned & turned prn. NPO for paracentesis in AM.

## 2017-03-20 NOTE — PROGRESS NOTES
1120 Called to US for paracentesis. VSS. o2sats 90% at start.LLQ. Paracentesis done. 4l Greenish brown fluid removed. O2 sats up to 92%. Report to RN on floor. Pt returned to room per cart.

## 2017-03-20 NOTE — PLAN OF CARE
Problem: Goal Outcome Summary  Goal: Goal Outcome Summary  Outcome: No Change  VSS on 1L O2 NC. A&O. Paracentesis this am, 4L off. Uncomfortable but denies pain medications. LS clear, heart murmur. Abdominal contour, RUQ hernia. +3 edema in legs, +2 in feet. Reddened area under panis of abdomen - intradry applied. Generalized weakness. 2 gm NA diet.

## 2017-03-20 NOTE — PLAN OF CARE
Problem: Goal Outcome Summary  Goal: Goal Outcome Summary  Outcome: No Change  VSS, heart murmur, A&O, Lung sounds clear. Bowel sounds active passing gas. Abdomen irregular contour. Bladder scan for 693- straight cathed for only 250 ml- tea colored urine. UA sent down to lab. 3+ edema in bilateral legs, ankle and 2+ feet. Redness under abdominal panus- Nystatin powder and intra dry applied. Regular diet- but only drank water and Orange juice. NPO at 12 midnight for paracentesis in the AM. Given 1 dose of lasix. Turn and repo PRN. Generalized weakness.

## 2017-03-20 NOTE — PROVIDER NOTIFICATION
VALERIE Romero @ 16:59 on 3/20/17--Continue Rocephin but increase dose to 2 gm IV q24h.  Latoya Alejo [facility]

## 2017-03-20 NOTE — PROGRESS NOTES
[facility]  Hospitalist Progress Note  Name: Jovita Marshall    MRN: [id number]  Provider:  Heather Bailey MD  03/20/17    Initial presenting complaint/issue to hospital (Diagnosis): abdominal pain.         Assessment and Plan:      Summary of Stay: Jovita Marshall is a 82 year old female admitted on 3/19/2017 with abdominal pain. Found to have 15 cm complex pelvic cystic mass and ascites worrisome for ovarian neoplasm versus large dermoid cyst.      Problem List:     1. Abdominal pain due to large cystic pelvic mass worrisome for ovarian neoplasm versus large dermoid cyst.  - ? Meigs syndrome.  - Paracentesis ordered for therapeutic and diagnosis.  - Tumor markers elevated.  - Will consult GYN-ONC as well.    2. CKD stage 3.  - Avoid nephrotoxins.    3. Hypothyroidism.  - On synthroid.    4. HLP.  - On zocor.    Addendum.  - Peritoneal fluid suggestive of SBP, start IV rocephin.      DVT Prophylaxis:  -  PCD's.  Code Status: Full Code  Discharge Dispo: TBD.  Estimated Disch Date / # of Days until Discharge: TBD.        Interval History:        No chest pain/SOB/N/V/diarrhea/fever. + abdominal pain persists.                  Physical Exam:      Last Vital Signs:  Temp: 97.5  F (36.4  C) Temp src: Oral BP: 130/71 Pulse: 83   Resp: 16 SpO2: 90 % O2 Device: None (Room air)      Intake/Output Summary (Last 24 hours) at 03/20/17 0932  Last data filed at 03/20/17 0600   Gross per 24 hour   Intake              480 ml   Output              250 ml   Net              230 ml     I/O last 3 completed shifts:  In: 480 [P.O.:480]  Out: 250 [Urine:250]  Vitals:    03/19/17 1645 03/20/17 0718   Weight: 122.9 kg (270 lb 15.1 oz) 122 kg (268 lb 15.4 oz)     GENERAL: She is alert, awake, oriented, coherent, nontoxic, in no acute distress.   HEENT: Pupils equal, round, reactive to light. Pharynx there is no exudate noted. There is no tender anterior cervical lymphadenopathy.   LUNGS: She has a few crackles in her bases bilaterally.    HEART: Regular rate, S1, S2 normal. She has a 3/6 systolic murmur.   ABDOMEN: Soft, protuberant, diffusely tender, no guarding, no rigidity, with good bowel sounds. She has an umbilical hernia in her epigastric region which is easily reducible.   EXTREMITIES: She has 3+ edema bilaterally.   NEUROLOGIC: Cranial nerves II-XII are grossly within normal limits. She moves all her extremities.            Medications:      All current medications were reviewed.         Data:      All new lab and imaging data was reviewed.   Labs:  No results for input(s): CULT in the last 168 hours.    Recent Labs  Lab 03/20/17  0822 03/19/17  1108   WBC 19.1* 14.4*   HGB 13.6 13.2   HCT 41.6 40.2   MCV 92 93    239       Recent Labs  Lab 03/20/17  0822 03/19/17  1732 03/19/17  1108     --  140   POTASSIUM 4.0  --  3.7   CHLORIDE 102  --  103   CO2 30  --  27   ANIONGAP 8  --  10   *  --  177*   BUN 25  --  19   CR 1.11*  --  0.90   GFRESTIMATED 47*  --  60*   GFRESTBLACK 57*  --  73   ELLIOTT 10.2*  --  9.6   PROTTOTAL 5.8*  --  6.1*   ALBUMIN 2.7* 3.3* 3.1*   BILITOTAL 0.6  --  0.8   ALKPHOS 38*  --  34*   AST 29  --  35   ALT 9  --  13       Recent Labs  Lab 03/20/17  0822   INR 1.29*      Recent Imaging:   Recent Results (from the past 24 hour(s))   CT Abdomen Pelvis w Contrast    Narrative    CT ABDOMEN PELVIS W CONTRAST 3/19/2017 12:52 PM    TECHNIQUE: Volumetric acquisition of CT images from the lung base  through the pelvis. Radiation dose for this scan was reduced using  automated exposure control, adjustment of the mA and/or KV according  to patient size, or iterative reconstruction technique.  121 mL  [id number]    COMPARISON: None.    HISTORY:  abd pain, painful ventral hernia, N/V/D     FINDINGS: Large complex pelvic mass measuring 15 cm in greatest  diameter. The mass is primarily cystic but does have some lipid  component and some calcifications.. It could be simply a large dermoid  cyst, however there is a  large amount of ascites and based on the size  of the mass and ovarian neoplasm cannot be excluded. In addition there  is a stellate 2 cm lesion in the retroareolar region of the right  breast which is indeterminant. Ventral hernia containing some omentum  and some soft tissue thickening which could represent some focal  inflammatory change. There are low-density lesions in both kidneys  which are too small to characterize, most likely simple cysts.  Negative GI tract. Negative liver, pancreas and spleen. Negative  adrenals .  Negative aorta and retroperitoneum. Negative pelvic  structures.       Impression    IMPRESSION:    1. Large complex pelvic mass measuring 15 cm in greatest diameter. The  mass is primarily cystic but does have some lipid component and some  calcifications.. It could be simply a large dermoid cyst, however  there is a large amount of ascites and based on the size of the mass  the finding is worrisome for an ovarian neoplasm.  2..  stellate 2 cm lesion in the retroareolar region of the right  breast which is indeterminant. Breast neoplasm cannot be excluded.  Diagnostic mammogram and ultrasound recommended for further  evaluation.  3. Ventral hernia above the level of the umbilicus containing some  omentum and some soft tissue thickening which could represent some  focal inflammatory change.  4... There are low-density lesions in both kidneys which are too small  to characterize, most likely simple cysts.    EBER FRIEDMAN MD

## 2017-03-20 NOTE — CONSULTS
[facility] Consultation      Jovita Marshall MRN# [id number]   YOB: 1934 Age: 82 year old   Date of Admission: 3/19/2017  Requesting physician: Heather Bailey MD  Reason for consult: Abdominal mass and breast mass.           Assessment and Plan:   82 year-old female with history of ER/LA+ right breast DCIS s/p lumpectomy in 2012 now presents with abdominal pain and found to have a large pelvic mass and right breast mass.    1. Large pelvic mass  2. Ascites  3. Abdominal pain  - Differential diagnosis includes dermoid cyst vs ovarian neoplasm, vs ?metastasis.  - Note CA-125 = 95.  CA 27-29 elevated at 297.  - Will consult Dr. Ruiz ([facility]) for further evaluation.  - Continue current analgesics for associated abdominal pain.  - Agree with plan for paracentesis with cytology.    4. Right breast mass  - Concerning for malignancy.  Approximately 2 cm on CT scan.  Will need diagnostic mammogram and ultrasound with biopsy.  If cannot obtain as inpatient, will pursue after hospital discharge.  - Patient has followed with Dr. Lei Bridges for past right breast DCIS.    5. Leukocytosis  - Likely reactive.  Recommend observation for now with serial CBC monitoring.    Full code.    Thank you very much for the consult request.  Our group will follow with you.    Sarah Brandon MD             Chief Complaint:   Abdominal Pain and Nausea, Vomiting, & Diarrhea           History of Present Illness:   This patient is a 82 year old female, patient of Dr. Lei Bridges, with a history of an ER/LA+ right breast DCIS s/p lumpectomy in 3/2012, hyperlipidemia, hypothyroidism, and hysterectomy (ovaries intact) in 1959 for cervical cancer.  She now presents with increasing abdominal distention and bilateral lower extremity edema since mid-January 2017.  She has develoepd worsening generalized abdominal pain over the past few weeks, worse day prior to admission.  She reports nausea and emesis, nonbloody loose  stools twice.  She denies dyspnea, chest pain, or dysuria.  She denies any breast pain.  Evaluation in the [facility] ED with CT abdomen/pelvis demonstrated a 15-cm complex pelvic mass that is cystic with some lipid component and calcifications with associated large amount of ascites concerning for ovarian neoplasm.  Scan also showed a 2-cm lesion in the retroareolar region of the right breast.    Jovita has been afebrile and narcotic analgesics have been relieving her abdominal pain.           Physical Exam:   Vitals were reviewed  Blood pressure 130/71, pulse 83, temperature 97.5  F (36.4  C), temperature source Oral, resp. rate 16, weight 122 kg (268 lb 15.4 oz), SpO2 90 %.  Temperatures:  Current - Temp: 97.5  F (36.4  C); Max - Temp  Av.7  F (36.5  C)  Min: 97.5  F (36.4  C)  Max: 98.2  F (36.8  C)  Respiration range: Resp  Av  Min: 16  Max: 16  Pulse range: Pulse  Av.6  Min: 83  Max: 86  Blood pressure range: Systolic (24hrs), Av , Min:116 , Max:146   ; Diastolic (24hrs), Av, Min:46, Max:82    Pulse oximetry range: SpO2  Av.5 %  Min: 90 %  Max: 99 %    Intake/Output Summary (Last 24 hours) at 17 1035  Last data filed at 17 0600   Gross per 24 hour   Intake              480 ml   Output              250 ml   Net              230 ml       GENERAL: No acute distress.  SKIN: No rashes or jaundice.  HEENT: Normocephalic, atraumatic. Eyes anicteric. Oropharynx is clear.  LYMPH: No palpable lymphadenopathy in the cervical, supraclavicular, axillary regions.  BREAST: I did not appreciate a right breast mass on exam.  Some telangiectasias present over the inferior portion of the right breast.  Right nipple everted without discharge.  HEART: Regular rate and rhythm with no murmurs.  LUNGS: Clear bilaterally.  ABDOMEN: Ventral hernia present in the epigastric region; firm mass over the epigastric to mid-abdomen.  EXTREMITIES: No clubbing, cyanosis; 3+ BLE edema.  MENTAL: Alert and oriented  to person, place, and time.  NEURO: Cranial nerves II through XII grossly intact.            Past Medical History:   I have reviewed this patient's past medical history  No past medical history on file.   See HPI.          Past Surgical History:   I have reviewed this patient's past surgical history  No past surgical history on file.   See HPI.            Social History:   I have reviewed this patient's social history  Social History   Substance Use Topics     Smoking status: Not on file     Smokeless tobacco: Not on file     Alcohol use Not on file   The patient is retired. She smoked cigarettes for 20 years, but quit in 1989. She drinks wine socially, about three to four glasses per week.          Family History:   I have reviewed this patient's family history  No family history on file.   Mother with uterine cancer.          Allergies:   No Known Allergies          Medications:   I have reviewed this patient's current medications  Prescriptions Prior to Admission   Medication Sig Dispense Refill Last Dose     ATENOLOL PO Take 50 mg by mouth every evening   3/18/2017 at pm     VITAMIN D, CHOLECALCIFEROL, PO Take 2,000 Units by mouth daily   3/18/2017 at Unknown time     multivitamin, therapeutic with minerals (MULTI-VITAMIN) TABS tablet Take 1 tablet by mouth daily   3/18/2017 at Unknown time     SIMVASTATIN PO Take 20 mg by mouth every evening (Takes half of a 40mg tablet for a total of 20mg in the evening)   3/18/2017 at pm     Potassium Chloride Obdulia CR (K-DUR PO) Take 20 mEq by mouth daily   3/18/2017 at am     LEVOTHYROXINE SODIUM PO Take 75 mcg by mouth daily   3/18/2017 at am     FUROSEMIDE PO Take 20 mg by mouth 2 times daily Morning and Noon   3/18/2017 at 1200     ASPIRIN EC PO Take 162 mg by mouth daily   3/18/2017 at am     Acetaminophen (TYLENOL PO) Take 1,300 mg by mouth daily as needed for mild pain or fever   3/17/2017     Current Facility-Administered Medications Ordered in Epic   Medication Dose  Route Frequency Last Rate Last Dose     sodium chloride (PF) 0.9% PF flush 3 mL  3 mL Intracatheter Q1H PRN         sodium chloride (PF) 0.9% PF flush 3 mL  3 mL Intracatheter Q8H   3 mL at 03/20/17 0727     levothyroxine (SYNTHROID/LEVOTHROID) tablet 75 mcg  75 mcg Oral Daily   75 mcg at 03/20/17 0945     simvastatin (ZOCOR) tablet 20 mg  20 mg Oral QPM         naloxone (NARCAN) injection 0.1-0.4 mg  0.1-0.4 mg Intravenous Q2 Min PRN         acetaminophen (TYLENOL) tablet 650 mg  650 mg Oral Q4H PRN         HYDROmorphone (PF) (DILAUDID) injection 0.3-0.5 mg  0.3-0.5 mg Intravenous Q2H PRN         HYDROcodone-acetaminophen (NORCO) 5-325 MG per tablet 1-2 tablet  1-2 tablet Oral Q4H PRN         ondansetron (ZOFRAN-ODT) ODT tab 4 mg  4 mg Oral Q6H PRN        Or     ondansetron (ZOFRAN) injection 4 mg  4 mg Intravenous Q6H PRN         prochlorperazine (COMPAZINE) injection 5 mg  5 mg Intravenous Q6H PRN        Or     prochlorperazine (COMPAZINE) tablet 5 mg  5 mg Oral Q6H PRN        Or     prochlorperazine (COMPAZINE) Suppository 12.5 mg  12.5 mg Rectal Q12H PRN         miconazole (MICATIN; MICRO GUARD) 2 % powder   Topical Q1H PRN         No current [facility]-ordered outpatient prescriptions on file.             Review of Systems:   The 10 point Review of Systems is negative other than noted in the HPI.            Data:   All laboratory data reviewed  Results for orders placed or performed during the hospital encounter of 03/19/17 (from the past 24 hour(s))   CBC with platelets differential   Result Value Ref Range    WBC 14.4 (H) 4.0 - 11.0 10e9/L    RBC Count 4.31 3.8 - 5.2 10e12/L    Hemoglobin 13.2 11.7 - 15.7 g/dL    Hematocrit 40.2 35.0 - 47.0 %    MCV 93 78 - 100 fl    MCH 30.6 26.5 - 33.0 pg    MCHC 32.8 31.5 - 36.5 g/dL    RDW 14.7 10.0 - 15.0 %    Platelet Count 239 150 - 450 10e9/L    Diff Method Automated Method     % Neutrophils 95.3 %    % Lymphocytes 3.0 %    % Monocytes 1.4 %    % Eosinophils 0.0 %    %  Basophils 0.0 %    % Immature Granulocytes 0.3 %    Nucleated RBCs 0 0 /100    Absolute Neutrophil 13.7 (H) 1.6 - 8.3 10e9/L    Absolute Lymphocytes 0.4 (L) 0.8 - 5.3 10e9/L    Absolute Monocytes 0.2 0.0 - 1.3 10e9/L    Absolute Eosinophils 0.0 0.0 - 0.7 10e9/L    Absolute Basophils 0.0 0.0 - 0.2 10e9/L    Abs Immature Granulocytes 0.1 0 - 0.4 10e9/L    Absolute Nucleated RBC 0.0    Comprehensive metabolic panel   Result Value Ref Range    Sodium 140 133 - 144 mmol/L    Potassium 3.7 3.4 - 5.3 mmol/L    Chloride 103 94 - 109 mmol/L    Carbon Dioxide 27 20 - 32 mmol/L    Anion Gap 10 3 - 14 mmol/L    Glucose 177 (H) 70 - 99 mg/dL    Urea Nitrogen 19 7 - 30 mg/dL    Creatinine 0.90 0.52 - 1.04 mg/dL    GFR Estimate 60 (L) >60 mL/min/1.7m2    GFR Estimate If Black 73 >60 mL/min/1.7m2    Calcium 9.6 8.5 - 10.1 mg/dL    Bilirubin Total 0.8 0.2 - 1.3 mg/dL    Albumin 3.1 (L) 3.4 - 5.0 g/dL    Protein Total 6.1 (L) 6.8 - 8.8 g/dL    Alkaline Phosphatase 34 (L) 40 - 150 U/L    ALT 13 0 - 50 U/L    AST 35 0 - 45 U/L   Lipase   Result Value Ref Range    Lipase 51 (L) 73 - 393 U/L   CT Abdomen Pelvis w Contrast    Narrative    CT ABDOMEN PELVIS W CONTRAST 3/19/2017 12:52 PM    TECHNIQUE: Volumetric acquisition of CT images from the lung base  through the pelvis. Radiation dose for this scan was reduced using  automated exposure control, adjustment of the mA and/or KV according  to patient size, or iterative reconstruction technique.  121 mL  [id number]    COMPARISON: None.    HISTORY:  abd pain, painful ventral hernia, N/V/D     FINDINGS: Large complex pelvic mass measuring 15 cm in greatest  diameter. The mass is primarily cystic but does have some lipid  component and some calcifications.. It could be simply a large dermoid  cyst, however there is a large amount of ascites and based on the size  of the mass and ovarian neoplasm cannot be excluded. In addition there  is a stellate 2 cm lesion in the retroareolar region of the  right  breast which is indeterminant. Ventral hernia containing some omentum  and some soft tissue thickening which could represent some focal  inflammatory change. There are low-density lesions in both kidneys  which are too small to characterize, most likely simple cysts.  Negative GI tract. Negative liver, pancreas and spleen. Negative  adrenals .  Negative aorta and retroperitoneum. Negative pelvic  structures.       Impression    IMPRESSION:    1. Large complex pelvic mass measuring 15 cm in greatest diameter. The  mass is primarily cystic but does have some lipid component and some  calcifications.. It could be simply a large dermoid cyst, however  there is a large amount of ascites and based on the size of the mass  the finding is worrisome for an ovarian neoplasm.  2..  stellate 2 cm lesion in the retroareolar region of the right  breast which is indeterminant. Breast neoplasm cannot be excluded.  Diagnostic mammogram and ultrasound recommended for further  evaluation.  3. Ventral hernia above the level of the umbilicus containing some  omentum and some soft tissue thickening which could represent some  focal inflammatory change.  4... There are low-density lesions in both kidneys which are too small  to characterize, most likely simple cysts.    EBER FRIEDMAN MD      Result Value Ref Range     95 (H) 0 - 30 U/mL   CA 27.29 Breast tumor marker   Result Value Ref Range    CA 27-29 297 (H) 0 - 39 U/mL   Albumin level   Result Value Ref Range    Albumin 3.3 (L) 3.4 - 5.0 g/dL   UA reflex to Microscopic   Result Value Ref Range    Color Urine Yellow     Appearance Urine Clear     Glucose Urine Negative NEG mg/dL    Bilirubin Urine Negative NEG    Ketones Urine 5 (A) NEG mg/dL    Specific Gravity Urine 1.038 (H) 1.003 - 1.035    Blood Urine Negative NEG    pH Urine 6.5 5.0 - 7.0 pH    Protein Albumin Urine 30 (A) NEG mg/dL    Urobilinogen mg/dL Normal 0.0 - 2.0 mg/dL    Nitrite Urine Negative NEG     Leukocyte Esterase Urine Negative NEG    Source Catheterized Urine     RBC Urine 2 0 - 2 /HPF    WBC Urine 2 0 - 2 /HPF    Mucous Urine Present (A) NEG /LPF   Comprehensive metabolic panel   Result Value Ref Range    Sodium 140 133 - 144 mmol/L    Potassium 4.0 3.4 - 5.3 mmol/L    Chloride 102 94 - 109 mmol/L    Carbon Dioxide 30 20 - 32 mmol/L    Anion Gap 8 3 - 14 mmol/L    Glucose 132 (H) 70 - 99 mg/dL    Urea Nitrogen 25 7 - 30 mg/dL    Creatinine 1.11 (H) 0.52 - 1.04 mg/dL    GFR Estimate 47 (L) >60 mL/min/1.7m2    GFR Estimate If Black 57 (L) >60 mL/min/1.7m2    Calcium 10.2 (H) 8.5 - 10.1 mg/dL    Bilirubin Total 0.6 0.2 - 1.3 mg/dL    Albumin 2.7 (L) 3.4 - 5.0 g/dL    Protein Total 5.8 (L) 6.8 - 8.8 g/dL    Alkaline Phosphatase 38 (L) 40 - 150 U/L    ALT 9 0 - 50 U/L    AST 29 0 - 45 U/L   CBC with platelets differential   Result Value Ref Range    WBC 19.1 (H) 4.0 - 11.0 10e9/L    RBC Count 4.50 3.8 - 5.2 10e12/L    Hemoglobin 13.6 11.7 - 15.7 g/dL    Hematocrit 41.6 35.0 - 47.0 %    MCV 92 78 - 100 fl    MCH 30.2 26.5 - 33.0 pg    MCHC 32.7 31.5 - 36.5 g/dL    RDW 15.1 (H) 10.0 - 15.0 %    Platelet Count 260 150 - 450 10e9/L    Diff Method Automated Method     % Neutrophils 93.0 %    % Lymphocytes 3.8 %    % Monocytes 2.7 %    % Eosinophils 0.0 %    % Basophils 0.1 %    % Immature Granulocytes 0.4 %    Nucleated RBCs 0 0 /100    Absolute Neutrophil 17.8 (H) 1.6 - 8.3 10e9/L    Absolute Lymphocytes 0.7 (L) 0.8 - 5.3 10e9/L    Absolute Monocytes 0.5 0.0 - 1.3 10e9/L    Absolute Eosinophils 0.0 0.0 - 0.7 10e9/L    Absolute Basophils 0.0 0.0 - 0.2 10e9/L    Abs Immature Granulocytes 0.1 0 - 0.4 10e9/L    Absolute Nucleated RBC 0.0    INR   Result Value Ref Range    INR 1.29 (H) 0.86 - 1.14

## 2017-03-20 NOTE — PLAN OF CARE
Problem: Goal Outcome Summary  Goal: Goal Outcome Summary  Spoke with breast center , they will do bilateral mammogram at 1015 tomorrow and bilateral US at 1045.Latasha Richardson RN

## 2017-03-20 NOTE — PLAN OF CARE
Problem: Goal Outcome Summary  Goal: Goal Outcome Summary  1410 Oncology office of Dr. Brandon, notified that breast center wishes order for mammogram and breast US to be changed to bilateral rather than only right side. Latasha Richardson RN

## 2017-03-21 ENCOUNTER — APPOINTMENT (OUTPATIENT)
Dept: CARDIOLOGY | Facility: CLINIC | Age: 82
DRG: 760 | End: 2017-03-21
Attending: INTERNAL MEDICINE
Payer: MEDICARE

## 2017-03-21 ENCOUNTER — HOSPITAL ENCOUNTER (OUTPATIENT)
Dept: MAMMOGRAPHY | Facility: CLINIC | Age: 82
DRG: 760 | End: 2017-03-21
Attending: INTERNAL MEDICINE
Payer: MEDICARE

## 2017-03-21 ENCOUNTER — APPOINTMENT (OUTPATIENT)
Dept: PHYSICAL THERAPY | Facility: CLINIC | Age: 82
DRG: 760 | End: 2017-03-21
Attending: INTERNAL MEDICINE
Payer: MEDICARE

## 2017-03-21 LAB
ANION GAP SERPL CALCULATED.3IONS-SCNC: 6 MMOL/L (ref 3–14)
BASOPHILS # BLD AUTO: 0 10E9/L (ref 0–0.2)
BASOPHILS NFR BLD AUTO: 0.1 %
BUN SERPL-MCNC: 29 MG/DL (ref 7–30)
CALCIUM SERPL-MCNC: 9.8 MG/DL (ref 8.5–10.1)
CHLORIDE SERPL-SCNC: 102 MMOL/L (ref 94–109)
CO2 SERPL-SCNC: 29 MMOL/L (ref 20–32)
CREAT SERPL-MCNC: 1.03 MG/DL (ref 0.52–1.04)
DIFFERENTIAL METHOD BLD: ABNORMAL
EOSINOPHIL # BLD AUTO: 0.1 10E9/L (ref 0–0.7)
EOSINOPHIL NFR BLD AUTO: 0.5 %
ERYTHROCYTE [DISTWIDTH] IN BLOOD BY AUTOMATED COUNT: 14.9 % (ref 10–15)
GFR SERPL CREATININE-BSD FRML MDRD: 51 ML/MIN/1.7M2
GLUCOSE SERPL-MCNC: 101 MG/DL (ref 70–99)
HCT VFR BLD AUTO: 36.8 % (ref 35–47)
HGB BLD-MCNC: 11.8 G/DL (ref 11.7–15.7)
IMM GRANULOCYTES # BLD: 0.1 10E9/L (ref 0–0.4)
IMM GRANULOCYTES NFR BLD: 0.3 %
LYMPHOCYTES # BLD AUTO: 0.9 10E9/L (ref 0.8–5.3)
LYMPHOCYTES NFR BLD AUTO: 5.7 %
MCH RBC QN AUTO: 29.6 PG (ref 26.5–33)
MCHC RBC AUTO-ENTMCNC: 32.1 G/DL (ref 31.5–36.5)
MCV RBC AUTO: 93 FL (ref 78–100)
MONOCYTES # BLD AUTO: 0.4 10E9/L (ref 0–1.3)
MONOCYTES NFR BLD AUTO: 2.6 %
NEUTROPHILS # BLD AUTO: 13.5 10E9/L (ref 1.6–8.3)
NEUTROPHILS NFR BLD AUTO: 90.8 %
NRBC # BLD AUTO: 0 10*3/UL
NRBC BLD AUTO-RTO: 0 /100
PLATELET # BLD AUTO: 216 10E9/L (ref 150–450)
POTASSIUM SERPL-SCNC: 3.9 MMOL/L (ref 3.4–5.3)
RBC # BLD AUTO: 3.98 10E12/L (ref 3.8–5.2)
SODIUM SERPL-SCNC: 137 MMOL/L (ref 133–144)
WBC # BLD AUTO: 14.9 10E9/L (ref 4–11)

## 2017-03-21 PROCEDURE — 76642 ULTRASOUND BREAST LIMITED: CPT | Mod: RT

## 2017-03-21 PROCEDURE — 36415 COLL VENOUS BLD VENIPUNCTURE: CPT | Performed by: INTERNAL MEDICINE

## 2017-03-21 PROCEDURE — 97110 THERAPEUTIC EXERCISES: CPT | Mod: GP

## 2017-03-21 PROCEDURE — 99233 SBSQ HOSP IP/OBS HIGH 50: CPT | Performed by: INTERNAL MEDICINE

## 2017-03-21 PROCEDURE — 25000132 ZZH RX MED GY IP 250 OP 250 PS 637: Mod: GY | Performed by: INTERNAL MEDICINE

## 2017-03-21 PROCEDURE — 40000193 ZZH STATISTIC PT WARD VISIT

## 2017-03-21 PROCEDURE — 85025 COMPLETE CBC W/AUTO DIFF WBC: CPT | Performed by: INTERNAL MEDICINE

## 2017-03-21 PROCEDURE — 93306 TTE W/DOPPLER COMPLETE: CPT | Mod: 26 | Performed by: INTERNAL MEDICINE

## 2017-03-21 PROCEDURE — 25000128 H RX IP 250 OP 636: Performed by: INTERNAL MEDICINE

## 2017-03-21 PROCEDURE — 97530 THERAPEUTIC ACTIVITIES: CPT | Mod: GP

## 2017-03-21 PROCEDURE — 97161 PT EVAL LOW COMPLEX 20 MIN: CPT | Mod: GP

## 2017-03-21 PROCEDURE — 12000007 ZZH R&B INTERMEDIATE

## 2017-03-21 PROCEDURE — 93306 TTE W/DOPPLER COMPLETE: CPT

## 2017-03-21 PROCEDURE — A9270 NON-COVERED ITEM OR SERVICE: HCPCS | Mod: GY | Performed by: INTERNAL MEDICINE

## 2017-03-21 PROCEDURE — 80048 BASIC METABOLIC PNL TOTAL CA: CPT | Performed by: INTERNAL MEDICINE

## 2017-03-21 RX ADMIN — CEFTRIAXONE 2 G: 2 INJECTION, POWDER, FOR SOLUTION INTRAMUSCULAR; INTRAVENOUS at 19:04

## 2017-03-21 RX ADMIN — LEVOTHYROXINE SODIUM 75 MCG: 75 TABLET ORAL at 10:41

## 2017-03-21 RX ADMIN — SIMVASTATIN 20 MG: 20 TABLET, FILM COATED ORAL at 20:20

## 2017-03-21 NOTE — PLAN OF CARE
Problem: Goal Outcome Summary  Goal: Goal Outcome Summary  Outcome: No Change  VSS, heart murmr, on 1L NC O2. A&Ox4. Abdominal discomfort but declined pain medication. RUQ hernia, abdominal contour. 3+ edema in BLE. Pulses marked on feet palpable. Assist of 1. Urinates at bedside commode, pt denies urge, BS overnight showed 703ml pt voided output 200ml pt has fluid in abdominal area that could contribute to high BS volume. Generalized weakness. Rash/red area under panis of abdomen - intradry in place. Diet: 2gm Na. Plan: Breast center appt 1015am 3/21.

## 2017-03-21 NOTE — PLAN OF CARE
Problem: Goal Outcome Summary  Goal: Goal Outcome Summary  Outcome: Improving  A/Ox4, AVSS, tolerating diet, A-1 with walker.. Pt voids infrequently but in larger volumes. All abd quadrants tender. Pt ambulated X2 today. Needs encouragement. LS diminished. BS active, passing flatus. Raw tissue in bilater groin folds and around giuliana area, nystatin powder and inter-dry top folds for treatment. Puncture bibi from paracentesis covered with bandage, CDI.

## 2017-03-21 NOTE — PROGRESS NOTES
[facility]  Hematology/oncology Progress Note            History:   Jovita Marshall is an 82 year old female, patient of Dr. Lei Bridges, with a history of an ER/[place]+ right breast DCIS s/p lumpectomy in 3/2012, hyperlipidemia, hypothyroidism, and hysterectomy (ovaries intact) in 1959 for cervical cancer. She now presents with increasing abdominal distention and bilateral lower extremity edema since mid-January 2017. She has develoepd worsening generalized abdominal pain over the past few weeks, worse day prior to admission. She reports nausea and emesis, nonbloody loose stools twice. She denies dyspnea, chest pain, or dysuria. She denies any breast pain. Evaluation in the [facility] with CT abdomen/pelvis demonstrated a 15-cm complex pelvic mass that is cystic with some lipid component and calcifications with associated large amount of ascites concerning for ovarian neoplasm. Scan also showed a 2-cm lesion in the retroareolar region of the right breast.  Elevated  and CA27-29  Had mass on right breast by CT but mammo and US were negative and the changes considered benign skin changes/scarring  Ascites was tapped 4 liters.         Medications:       sodium chloride (PF)  3 mL Intracatheter Q8H     levothyroxine (SYNTHROID/LEVOTHROID) tablet 75 mcg  75 mcg Oral Daily     simvastatin (ZOCOR) tablet 20 mg  20 mg Oral QPM     cefTRIAXone  2 g Intravenous Q24H                ROS:   RESP, CV, GI,, MUSCULOSKELETAL, HEME/ALLERGY/IMMUNE,  Skin: reviewed and negative except the positives mentioned in this note            Physical Exam:       Vital Sign Ranges  Temp:  [97.8  F (36.6  C)-98.3  F (36.8  C)] 97.9  F (36.6  C)  Pulse:  [78-89] 83  Resp:  [18] 18  BP: (119-164)/(57-78) 123/57  SpO2:  [90 %-95 %] 94 %      I/O last 3 completed shifts:  In: 580 [P.O.:480; I.V.:100]  Out: 700 [Urine:700]    Constitutional: Awake, alert, cooperative, no apparent distress  HEENT: unremarkable  Neck: Supple, no  adenopathy, thyroid symmetric, not enlarged and no tenderness  Lungs: No increased work of breathing, good air exchange, clear to auscultation bilaterally, no crackles or wheezing.  Cardiovascular: Regular rate and rhythm, normal S1 and S2,no murmur noted.  Abdomen: distended, non-tender, no masses palpated, no hepatosplenomegally.  Ext: legs non pitting edema, compression stockings           Data:       ROUTINE LABS (Last four results)  CMP  Recent Labs  Lab 03/21/17  0720 03/20/17  0822 03/19/17  1732 03/19/17  1108    140  --  140   POTASSIUM 3.9 4.0  --  3.7   CHLORIDE 102 102  --  103   CO2 29 30  --  27   ANIONGAP 6 8  --  10   * 132*  --  177*   BUN 29 25  --  19   CR 1.03 1.11*  --  0.90   GFRESTIMATED 51* 47*  --  60*   GFRESTBLACK 62 57*  --  73   ELLIOTT 9.8 10.2*  --  9.6   PROTTOTAL  --  5.8*  --  6.1*   ALBUMIN  --  2.7* 3.3* 3.1*   BILITOTAL  --  0.6  --  0.8   ALKPHOS  --  38*  --  34*   AST  --  29  --  35   ALT  --  9  --  13     CBC  Recent Labs  Lab 03/21/17  0720 03/20/17  0822 03/19/17  1108   WBC 14.9* 19.1* 14.4*   RBC 3.98 4.50 4.31   HGB 11.8 13.6 13.2   HCT 36.8 41.6 40.2   MCV 93 92 93   MCH 29.6 30.2 30.6   MCHC 32.1 32.7 32.8   RDW 14.9 15.1* 14.7    260 239     INR  Recent Labs  Lab 03/20/17  0822   INR 1.29*            Assessment and Plan:   1. Large pelvic mass  2. Ascites  3. Abdominal pain  - Note CA-125 = 95. CA 27-29 elevated at 297.  4. History of DCIS, breast imaging unremarkable.    P. Follow up on as cites cytology, GYN oncology will decide on next step. I do not suspect that history of DCIS is relevant to current findings (although distant relapse may occur rarely)    Francia Guthrie M.D.

## 2017-03-21 NOTE — PROGRESS NOTES
[facility]    GYN ONC Progress Note    Date of Service (when I saw the patient): 03/21/2017    Assessment & Plan   Jovita Marshall is a 82 year old female who was admitted on 3/19/2017 with abdominal pain associated with a 15 cm pelvic mass.     1. Abdominal pain associated with a large, 15cm pelvic mass and ascites. This could likely be a dermoid cyst that torsed. Dermoids are not associated with ascites and the mass is not solid and therefore likely not a fibroid with associated Meig's syndrome. At her age it could also be malignant. Further evaluation with an US demonstrates a 16.7x16.9x15.6cm complex cystic lesion. We will await pathology from paracentesis. We discussed surgical removal of this mass either robotically or with a laparotomy depending on pathology results. This can be done as an outpatient procedure.   -elevated CA 27-29 = 297 and = 95   2. Right breast mass  -Breast biopsy today. Mammogram?  3. Leukocytosis: this could be reactive. Improved. Continue to monitor.       Debbie Brown CNP  GYN ONC  Cell: [phone]      Interval History    [facility] in [facility]-being wheeled to US for biopsy. No questions. Will f/u once path back.     -Data reviewed today: I reviewed all new labs and imaging results over the last 24 hours.    Physical Exam   Temp: 97.9  F (36.6  C) Temp src: Oral BP: 123/57 Pulse: 83   Resp: 18 SpO2: 94 % O2 Device: Nasal cannula Oxygen Delivery: 1 LPM  Vitals:    03/19/17 1645 03/20/17 0718   Weight: 122.9 kg (270 lb 15.1 oz) 122 kg (268 lb 15.4 oz)     Vital Signs with Ranges  Temp:  [97.8  F (36.6  C)-98.3  F (36.8  C)] 97.9  F (36.6  C)  Pulse:  [78-89] 83  Resp:  [18] 18  BP: (119-164)/(57-78) 123/57  SpO2:  [90 %-95 %] 94 %  I/O last 3 completed shifts:  In: 580 [P.O.:480; I.V.:100]  Out: 700 [Urine:700]    General: Sitting in wheelchair on NAD  Lungs: No dyspnea  Rest of exam deferred    Medications        sodium chloride (PF)  3 mL Intracatheter Q8H      levothyroxine (SYNTHROID/LEVOTHROID) tablet 75 mcg  75 mcg Oral Daily     simvastatin (ZOCOR) tablet 20 mg  20 mg Oral QPM     cefTRIAXone  2 g Intravenous Q24H       Data     Recent Labs  Lab 03/21/17  0720 03/20/17  0822 03/19/17  1732 03/19/17  1108   WBC 14.9* 19.1*  --  14.4*   HGB 11.8 13.6  --  13.2   MCV 93 92  --  93    260  --  239   INR  --  1.29*  --   --     140  --  140   POTASSIUM 3.9 4.0  --  3.7   CHLORIDE 102 102  --  103   CO2 29 30  --  27   BUN 29 25  --  19   CR 1.03 1.11*  --  0.90   ANIONGAP 6 8  --  10   ELLIOTT 9.8 10.2*  --  9.6   * 132*  --  177*   ALBUMIN  --  2.7* 3.3* 3.1*   PROTTOTAL  --  5.8*  --  6.1*   BILITOTAL  --  0.6  --  0.8   ALKPHOS  --  38*  --  34*   ALT  --  9  --  13   AST  --  29  --  35   LIPASE  --   --   --  51*       Recent Results (from the past 24 hour(s))   US Paracentesis initial: high volume    Narrative    ULTRASONIC GUIDED PARACENTESIS 3/20/2017 11:54 AM    HISTORY: Ascites.    PROCEDURE AND FINDINGS: Using ultrasonic guidance, permanent image  documentation, and 10 mL of 1% Xylocaine, approximately 4000 mL of  brownish fluid was obtained from the peritoneal cavity. No  complications.       Impression    IMPRESSION: Successful ultrasonically guided paracentesis.    WESTON PLASENCIA MD   US Pelvic Complete with Transvaginal    Narrative    PELVIC ULTRASOUND 3/20/2017 12:06 PM    HISTORY: Evaluate pelvic mass. Evaluate for torsion. Previous  hysterectomy.    COMPARISON: None.    TECHNIQUE: Transabdominal and transvaginal imaging were obtained.  Transvaginal imaging was obtained to better evaluate the uterus and  adnexa. Doppler waveform analysis performed.    FINDINGS: Uterus is surgically absent. Neither ovary is identified.  16.7 x 16.9 x 15.6 cm complex cystic lesion identified in the mid  pelvis which corresponds with the abnormality seen on CT. This is  indeterminate but may represent an ovarian tumor. Moderate amount of  ascites/free fluid  in the pelvis. Remainder of the scan is  unremarkable.      Impression    IMPRESSION:   1. Previous hysterectomy.  2. Neither ovary is identified.  3. Large complex cystic mass in the mid pelvis corresponding with the  abnormality seen on CT.  4. Pelvic ascitic fluid.     WESTON PLASENCIA MD

## 2017-03-21 NOTE — PROGRESS NOTES
[facility]  Hospitalist Progress Note  Name: Jovita Marshall    MRN: [id number]  Provider:  Heather Bailey MD  03/21/17    Initial presenting complaint/issue to hospital (Diagnosis): abdominal pain.         Assessment and Plan:      Summary of Stay: Jovita Marshall is a 82 year old female admitted on 3/19/2017 with abdominal pain. Found to have 15 cm complex pelvic cystic mass and ascites worrisome for ovarian neoplasm versus large dermoid cyst. Hx of breast ca s/p lumpectomy and XRT, hx of partial hysterectomy.     Problem List:      1. Abdominal pain due to large cystic pelvic mass worrisome for ovarian neoplasm versus large dermoid cyst.  - ? Meigs syndrome.  - s/p paracentesis 3/20, 4 L removed, which showed SBP, on IV rocephin.  - Tumor markers elevated.  - Onc/Gyn-Onc following.     2. CKD stage 3.  - Avoid nephrotoxins.     3. Hypothyroidism.  - On synthroid.     4. HLP.  - On zocor.     5.  Stellate 2 cm lesion R breast.  - Mammogram and U/S pending.    6. Systolic murmur.  - ? MR, will get TTE.     DVT Prophylaxis:  - PCD's.  Code Status: Full Code  Discharge Dispo: Home.  Estimated Disch Date / # of Days until Discharge: TBD.              Interval History:        No chest pain/SOB/N/V/diarrhea/fever. + abdominal pain is better.                  Physical Exam:      Last Vital Signs:  Temp: 97.9  F (36.6  C) Temp src: Oral BP: 123/57 Pulse: 83   Resp: 18 SpO2: 94 % O2 Device: Nasal cannula Oxygen Delivery: 1 LPM    Intake/Output Summary (Last 24 hours) at 03/21/17 0831  Last data filed at 03/21/17 0600   Gross per 24 hour   Intake              580 ml   Output              700 ml   Net             -120 ml     I/O last 3 completed shifts:  In: 580 [P.O.:480; I.V.:100]  Out: 700 [Urine:700]  Vitals:    03/19/17 1645 03/20/17 0718   Weight: 122.9 kg (270 lb 15.1 oz) 122 kg (268 lb 15.4 oz)     GENERAL: She is alert, awake, oriented, coherent, nontoxic, in no acute distress.   HEENT: Pupils equal, round,  reactive to light. Pharynx there is no exudate noted. There is no tender anterior cervical lymphadenopathy.   LUNGS: She has a few crackles in her bases bilaterally.   HEART: Regular rate, S1, S2 normal. She has a 3/6 systolic murmur.   ABDOMEN: Soft, protuberant, diffusely tender, no guarding, no rigidity, with good bowel sounds. She has an umbilical hernia in her epigastric region which is easily reducible.   EXTREMITIES: She has 3+ edema bilaterally.   NEUROLOGIC: Cranial nerves II-XII are grossly within normal limits. She moves all her extremities.            Medications:      All current medications were reviewed.         Data:      All new lab and imaging data was reviewed.   Labs:    Recent Labs  Lab 03/20/17  1125   CULT Pending       Recent Labs  Lab 03/21/17  0720 03/20/17  0822 03/19/17  1108   WBC 14.9* 19.1* 14.4*   HGB 11.8 13.6 13.2   HCT 36.8 41.6 40.2   MCV 93 92 93    260 239       Recent Labs  Lab 03/21/17  0720 03/20/17  0822 03/19/17  1732 03/19/17  1108    140  --  140   POTASSIUM 3.9 4.0  --  3.7   CHLORIDE 102 102  --  103   CO2 29 30  --  27   ANIONGAP 6 8  --  10   * 132*  --  177*   BUN 29 25  --  19   CR 1.03 1.11*  --  0.90   GFRESTIMATED 51* 47*  --  60*   GFRESTBLACK 62 57*  --  73   ELLIOTT 9.8 10.2*  --  9.6   PROTTOTAL  --  5.8*  --  6.1*   ALBUMIN  --  2.7* 3.3* 3.1*   BILITOTAL  --  0.6  --  0.8   ALKPHOS  --  38*  --  34*   AST  --  29  --  35   ALT  --  9  --  13      Recent Imaging:   Recent Results (from the past 24 hour(s))   US Paracentesis initial: high volume    Narrative    ULTRASONIC GUIDED PARACENTESIS 3/20/2017 11:54 AM    HISTORY: Ascites.    PROCEDURE AND FINDINGS: Using ultrasonic guidance, permanent image  documentation, and 10 mL of 1% Xylocaine, approximately 4000 mL of  brownish fluid was obtained from the peritoneal cavity. No  complications.       Impression    IMPRESSION: Successful ultrasonically guided paracentesis.    WESTON PLASENCIA MD    US Pelvic Complete with Transvaginal    Narrative    PELVIC ULTRASOUND 3/20/2017 12:06 PM    HISTORY: Evaluate pelvic mass. Evaluate for torsion. Previous  hysterectomy.    COMPARISON: None.    TECHNIQUE: Transabdominal and transvaginal imaging were obtained.  Transvaginal imaging was obtained to better evaluate the uterus and  adnexa. Doppler waveform analysis performed.    FINDINGS: Uterus is surgically absent. Neither ovary is identified.  16.7 x 16.9 x 15.6 cm complex cystic lesion identified in the mid  pelvis which corresponds with the abnormality seen on CT. This is  indeterminate but may represent an ovarian tumor. Moderate amount of  ascites/free fluid in the pelvis. Remainder of the scan is  unremarkable.      Impression    IMPRESSION:   1. Previous hysterectomy.  2. Neither ovary is identified.  3. Large complex cystic mass in the mid pelvis corresponding with the  abnormality seen on CT.  4. Pelvic ascitic fluid.     WESTON PLASENCIA MD

## 2017-03-21 NOTE — PROGRESS NOTES
03/21/17 1048   Quick Adds   Type of Visit Initial PT Evaluation   Living Environment   Lives With spouse   Living Arrangements house  (split level)   Home Accessibility stairs to enter home;stairs within home   Number of Stairs to Enter Home 7   Number of Stairs Within Home 7   Stair Railings at Home inside, present on right side   Self-Care   Usual Activity Tolerance moderate   Current Activity Tolerance fair   Regular Exercise no   Equipment Currently Used at Home cane, straight;walker, rolling   Functional Level Prior   Ambulation 1-->assistive equipment   Transferring 1-->assistive equipment   Fall history within last six months yes   Number of times patient has fallen within last six months 1   Which of the above functional risks had a recent onset or change? ambulation;transferring   General Information   Onset of Illness/Injury or Date of Surgery - Date 03/19/17   Referring Physician Heather Bailey MD   Patient/Family Goals Statement return home if able   Pertinent History of Current Problem (include personal factors and/or comorbidities that impact the POC) Admitted with abdominal pain, found to have a large cystic pelvic mass, ascites. PMH: CKD3, HTN, history of breast cancer [facility] lumpectomy 2012, cervical cancer.   Precautions/Limitations fall precautions   Weight-Bearing Status - LLE full weight-bearing   Weight-Bearing Status - RLE full weight-bearing   General Observations Up in chair   General Info Comments Pt states her spouse is legally blind and [place] and she cares for him, doesn't need a whole lot of help. Pt is the .   Cognitive Status Examination   Orientation orientation to person, place and time   Level of Consciousness alert   Follows Commands and Answers Questions 100% of the time;able to follow multistep instructions   Personal Safety and Judgment intact   Memory intact   Pain Assessment   Patient Currently in Pain Yes, see Vital Sign flowsheet  (abdominal pain with movement)  "  Posture    Posture Forward head position;Protracted shoulders   Range of Motion (ROM)   ROM Quick Adds No deficits were identified   Strength   Strength Comments B hip flex NT due to abdominal pain, B knee ext 4+/5, DF 4/5   Bed Mobility   Bed Mobility Comments NT, pt in chair   Transfer Skills   Transfer Comments Sit to stand with FWW and CGA   Gait   Gait Comments Pt amb 5 ft with FWW and CGA   Balance   Balance Comments Balance steady with walker   General Therapy Interventions   Planned Therapy Interventions bed mobility training;gait training;neuromuscular re-education;strengthening;transfer training   Clinical Impression   Criteria for Skilled Therapeutic Intervention yes, treatment indicated   PT Diagnosis Difficulty ambulating   Influenced by the following impairments Pain, dec strength, activity tolerance, balance   Functional limitations due to impairments Difficulty ambualting and transferring   Clinical Presentation Stable/Uncomplicated   Clinical Presentation Rationale medically improved   Clinical Decision Making (Complexity) Low complexity   Therapy Frequency` daily   Predicted Duration of Therapy Intervention (days/wks) 4 days   Anticipated Discharge Disposition Home with Home Therapy   Risk & Benefits of therapy have been explained Yes   Patient, Family & other staff in agreement with plan of care Yes   [facility] [employer] \"6 Clicks\"   2016, Trustees of [facility], under license to [employer].  All rights reserved.   6 Clicks Short Forms Basic Mobility Inpatient Short Form   [facility] [employer]  \"6 Clicks\" V.2 Basic Mobility Inpatient Short Form   1. Turning from your back to your side while in a flat bed without using bedrails? 4 - None   2. Moving from lying on your back to sitting on the side of a flat bed without using bedrails? 3 - A Little   3. Moving to and from a bed to a chair (including a wheelchair)? 3 - A Little   4. Standing up from a chair using your arms (e.g., " wheelchair, or bedside chair)? 3 - A Little   5. To walk in hospital room? 3 - A Little   6. Climbing 3-5 steps with a railing? 3 - A Little   Basic Mobility Raw Score (Score out of 24.Lower scores equate to lower levels of function) 19   Total Evaluation Time   Total Evaluation Time (Minutes) 15

## 2017-03-21 NOTE — PLAN OF CARE
Problem: Goal Outcome Summary  Goal: Goal Outcome Summary  PT: Orders received, eval completed, treatment initiated. Pt admitted with abdominal pain, found to have ascites and large cystic pelvic mass. Prior to admit pt was living with her spouse in a split level house, uses a cane or walker, independent with mobility. Pt's spouse is legally blind and [place] and pt assists with his cares but he does not need very much help per pt. Currently requires CGA sit to/from stand with FWW, CGA for gait of 45 ft with FWW with stable balance, independent with pericares in the bathroom. Participated in strengthening activities. Needs encouragement to do as much as she can. Pt demonstrates pain, dec strength, activity tolerance and difficulty ambulating and transferring and would benefit from skilled PT services in order to improve this. Anticipate pt will be able to discharge to home with home PT pending progress and ability to do stairs (split level home.) Pt would prefer to go home at discharge.

## 2017-03-22 ENCOUNTER — APPOINTMENT (OUTPATIENT)
Dept: PHYSICAL THERAPY | Facility: CLINIC | Age: 82
DRG: 760 | End: 2017-03-22
Attending: INTERNAL MEDICINE
Payer: MEDICARE

## 2017-03-22 LAB — COPATH REPORT: NORMAL

## 2017-03-22 PROCEDURE — 99232 SBSQ HOSP IP/OBS MODERATE 35: CPT | Performed by: HOSPITALIST

## 2017-03-22 PROCEDURE — 25000128 H RX IP 250 OP 636: Performed by: INTERNAL MEDICINE

## 2017-03-22 PROCEDURE — 97530 THERAPEUTIC ACTIVITIES: CPT | Mod: GP | Performed by: PHYSICAL THERAPIST

## 2017-03-22 PROCEDURE — 25000132 ZZH RX MED GY IP 250 OP 250 PS 637: Mod: GY | Performed by: INTERNAL MEDICINE

## 2017-03-22 PROCEDURE — A9270 NON-COVERED ITEM OR SERVICE: HCPCS | Mod: GY | Performed by: INTERNAL MEDICINE

## 2017-03-22 PROCEDURE — 12000007 ZZH R&B INTERMEDIATE

## 2017-03-22 PROCEDURE — 40000193 ZZH STATISTIC PT WARD VISIT: Performed by: PHYSICAL THERAPIST

## 2017-03-22 PROCEDURE — 97116 GAIT TRAINING THERAPY: CPT | Mod: GP | Performed by: PHYSICAL THERAPIST

## 2017-03-22 RX ADMIN — SIMVASTATIN 20 MG: 20 TABLET, FILM COATED ORAL at 21:20

## 2017-03-22 RX ADMIN — CEFTRIAXONE 2 G: 2 INJECTION, POWDER, FOR SOLUTION INTRAMUSCULAR; INTRAVENOUS at 18:16

## 2017-03-22 RX ADMIN — LEVOTHYROXINE SODIUM 75 MCG: 75 TABLET ORAL at 09:03

## 2017-03-22 NOTE — PLAN OF CARE
Problem: Goal Outcome Summary  Goal: Goal Outcome Summary  Outcome: No Change  A&O. VSS on RA. Denied pain except with abd palpation. Abdomen distended and firm. Tender with palpation.  Mass visible to pelvic area. Bilateral LE edema. Ambulating with 1A and a walker. PCDs on. Rash to abdominal folds red. Possible paracentesis today.

## 2017-03-22 NOTE — PROGRESS NOTES
[facility]  Hospitalist Progress Note  Name: Jovita Marshall    MRN: [id number]  Provider:  Heather Bailey MD  03/22/2017    Initial presenting complaint/issue to hospital (Diagnosis): abdominal pain.         Assessment and Plan:      Summary of Stay: Jovita Marshall is a 82 year old female admitted on 3/19/2017 with abdominal pain. Found to have 15 cm complex pelvic cystic mass and associated ascites worrisome for ovarian neoplasm. Hx of breast cancer s/p lumpectomy and XRT, hx of partial hysterectomy.     Abdominal pain due to large cystic pelvic mass worrisome for ovarian neoplasm versus large dermoid cyst.  - Possibly Meigs syndrome.  - s/p paracentesis 3/20, 4 L removed, which showed SBP, on IV rocephin.  - Tumor markers elevated.  - Onc/Gyn-Onc following.and suggesting follow up as an outpatient.     2. CKD stage 3.  - Avoid nephrotoxins.     3. Hypothyroidism.  - On synthroid.     4. HLP.  - On zocor.     5.  Stellate 2 cm lesion R breast.  - Mammogram and U/S pending.    6. Systolic murmur.  - ? MR, will get TTE.     DVT Prophylaxis:  - PCD's.    Code Status: Full Code    Discharge Dispo: home, with Outpatient  Gyn/onc follow up          Interval History:      No chest pain/SOB/N/V/diarrhea/fever. + abdominal pain is better.                  Physical Exam:      Last Vital Signs:  Temp: 97.9  F (36.6  C) Temp src: Oral BP: 158/75 Pulse: 87   Resp: 16 SpO2: 93 % O2 Device: None (Room air)      Intake/Output Summary (Last 24 hours) at 03/21/17 0831  Last data filed at 03/21/17 0600   Gross per 24 hour   Intake              580 ml   Output              700 ml   Net             -120 ml     I/O last 3 completed shifts:  In: 600 [P.O.:600]  Out: 950 [Urine:950]  Vitals:    03/19/17 1645 03/20/17 0718   Weight: 122.9 kg (270 lb 15.1 oz) 122 kg (268 lb 15.4 oz)     GENERAL: She is alert, awake, oriented, coherent, nontoxic, in no acute distress.   HEENT: Pupils equal, round, reactive to light. Pharynx there is no  exudate noted. There is no tender anterior cervical lymphadenopathy.   LUNGS: She has a few crackles in her bases bilaterally.   HEART: Regular rate, S1, S2 normal. She has a 3/6 systolic murmur.   ABDOMEN: Soft, protuberant, diffusely tender, no guarding, no rigidity, with good bowel sounds. She has an umbilical hernia in her epigastric region which is easily reducible.   EXTREMITIES: She has 3+ edema bilaterally.   NEUROLOGIC: Cranial nerves II-XII are grossly within normal limits. She moves all her extremities.            Medications:      All current medications were reviewed.         Data:      All new lab and imaging data was reviewed.   Labs:    Recent Labs  Lab 03/20/17  1125   CULT Culture negative monitoring continues  Culture negative monitoring continues       Recent Labs  Lab 03/21/17  0720 03/20/17  0822 03/19/17  1108   WBC 14.9* 19.1* 14.4*   HGB 11.8 13.6 13.2   HCT 36.8 41.6 40.2   MCV 93 92 93    260 239       Recent Labs  Lab 03/21/17  0720 03/20/17  0822 03/19/17  1732 03/19/17  1108    140  --  140   POTASSIUM 3.9 4.0  --  3.7   CHLORIDE 102 102  --  103   CO2 29 30  --  27   ANIONGAP 6 8  --  10   * 132*  --  177*   BUN 29 25  --  19   CR 1.03 1.11*  --  0.90   GFRESTIMATED 51* 47*  --  60*   GFRESTBLACK 62 57*  --  73   ELLIOTT 9.8 10.2*  --  9.6   PROTTOTAL  --  5.8*  --  6.1*   ALBUMIN  --  2.7* 3.3* 3.1*   BILITOTAL  --  0.6  --  0.8   ALKPHOS  --  38*  --  34*   AST  --  29  --  35   ALT  --  9  --  13      Recent Imaging:   No results found for this or any previous visit (from the past 24 hour(s)).     Alex Amador MD  Internal Medicine, Hospitalist  Pager#; [phone]

## 2017-03-22 NOTE — PROGRESS NOTES
[facility]    GYN ONC Progress Note    Date of Service (when I saw the patient): 03/22/2017    Assessment & Plan   Jovita Marshall is a 82 year old female who was admitted on 3/19/2017 with abdominal pain associated with a 15 cm pelvic mass.     1. Abdominal pain associated with a large, 15cm pelvic mass and ascites. This could likely be a dermoid cyst that torsed. Dermoids are not associated with ascites and the mass is not solid and therefore likely not a fibroid with associated Meig's syndrome. At her age it could also be malignant. Further evaluation with an US demonstrates a 16.7x16.9x15.6cm complex cystic lesion. We will await pathology from paracentesis, will likely be back today. We discussed surgical removal of this mass either robotically or with a laparotomy depending on pathology results. This can be done as an outpatient procedure. Surgery likely next week.   -elevated CA 27-29 = 297 and = 95   2. Right breast mass  -US demonstrates scar tissue, no need for bx.   3. Leukocytosis: likely reactive. Improved. Continue to monitor.     Addendum: Spoke with patient and daughter regarding cytology negative results. Plan for outpatient surgery next Thursday 3/30 at  Robotic laparoscopic BSO, poss. Staging, poss. Laparotomy. Dr. Ruiz will be by to see patient and discuss tomorrow. From out standpoint, can be dc'd after that.     Debbie Brown CNP  GYN ONC  Cell: [phone]      Interval History    Lying in bed in NAD. Comfortable. Some abdominal tenderness with palpation. States she is having another paracentesis today.     -Data reviewed today: I reviewed all new labs and imaging results over the last 24 hours.    Physical Exam   Temp: 97.9  F (36.6  C) Temp src: Oral BP: 158/75 Pulse: 87   Resp: 16 SpO2: 93 % O2 Device: None (Room air)    Vitals:    03/19/17 1645 03/20/17 0718   Weight: 122.9 kg (270 lb 15.1 oz) 122 kg (268 lb 15.4 oz)     Vital Signs with Ranges  Temp:  [97.8  F (36.6   C)-98.5  F (36.9  C)] 97.9  F (36.6  C)  Pulse:  [83-90] 87  Resp:  [14-18] 16  BP: (128-158)/(58-78) 158/75  SpO2:  [92 %-93 %] 93 %  I/O last 3 completed shifts:  In: 600 [P.O.:600]  Out: 950 [Urine:950]    General: Sitting in wheelchair on NAD  Lungs: No dyspnea  Abd: Soft, large ventral hernia apparent just above umbilicus, unable to palpate mass.    Medications        sodium chloride (PF)  3 mL Intracatheter Q8H     levothyroxine (SYNTHROID/LEVOTHROID) tablet 75 mcg  75 mcg Oral Daily     simvastatin (ZOCOR) tablet 20 mg  20 mg Oral QPM     cefTRIAXone  2 g Intravenous Q24H       Data     Recent Labs  Lab 03/21/17  0720 03/20/17  0822 03/19/17  1732 03/19/17  1108   WBC 14.9* 19.1*  --  14.4*   HGB 11.8 13.6  --  13.2   MCV 93 92  --  93    260  --  239   INR  --  1.29*  --   --     140  --  140   POTASSIUM 3.9 4.0  --  3.7   CHLORIDE 102 102  --  103   CO2 29 30  --  27   BUN 29 25  --  19   CR 1.03 1.11*  --  0.90   ANIONGAP 6 8  --  10   ELLIOTT 9.8 10.2*  --  9.6   * 132*  --  177*   ALBUMIN  --  2.7* 3.3* 3.1*   PROTTOTAL  --  5.8*  --  6.1*   BILITOTAL  --  0.6  --  0.8   ALKPHOS  --  38*  --  34*   ALT  --  9  --  13   AST  --  29  --  35   LIPASE  --   --   --  51*       Recent Results (from the past 24 hour(s))   US Breast Right    Narrative    ULTRASOUND RIGHT  BREAST  3/21/2017    HISTORY: Possible abnormality in the right breast on recent CT scan.  History of right lumpectomy. Patient reports radiation therapy in  2012.    COMPARISON:  CT scan 3/19/2017. Mammogram 11/28/2016.    FINDINGS:  No suspicious findings in the subareolar areola right  breast in the region of concern on the mammogram.  Mild skin  thickening from prior radiation therapy. It is likely that the density  in the mammogram is related to scar tissue from prior lumpectomy.      Impression    IMPRESSION: BI-RADS CATEGORY: 1 -  NEGATIVE    RECOMMENDED FOLLOW-UP: Annual Mammography or as per oncology.    KATINA URIBE,  MD

## 2017-03-22 NOTE — PROGRESS NOTES
[facility]    Oncology Progress Note    Date of Service (when I saw the patient): 03/22/2017     Assessment & Plan   Jovita Marshall is a 82 year old female who was admitted on 3/19/2017.     Right Breast DCIS  -s/p lumpectomy 3/2012, ER/FL positive  -CT findings likely due to scarring from prior treatment  -no evidence of concern by ultrasound 3/21/17 and recent mammogram 11/28/16    Pelvic Mass with Ascites  -etiology unclear  -ascitic fluid cultures negative, cytology still pending  -elevated Ca 125 and Ca 27.29 nonspecific  -surgery per GynOnc Dr Ruiz    Leukocytosis  -reactive on empiric antibiotics        Code Status: Full Code    [facility]    Interval History   Abdominal distension and tenderness improved but not resolved    Physical Exam   Temp: 97.9  F (36.6  C) Temp src: Oral BP: 158/75 Pulse: 87   Resp: 16 SpO2: 93 % O2 Device: None (Room air)    Vitals:    03/19/17 1645 03/20/17 0718   Weight: 122.9 kg (270 lb 15.1 oz) 122 kg (268 lb 15.4 oz)     Vital Signs with Ranges  Temp:  [97.8  F (36.6  C)-98.5  F (36.9  C)] 97.9  F (36.6  C)  Pulse:  [83-90] 87  Resp:  [14-18] 16  BP: (128-158)/(58-78) 158/75  SpO2:  [92 %-93 %] 93 %  I/O last 3 completed shifts:  In: 600 [P.O.:600]  Out: 950 [Urine:950]    Constitutional: awake, alert, cooperative, no apparent distress  GI:  soft, distended, non-tender  Musculoskeletal: no pedal edema    Medications        sodium chloride (PF)  3 mL Intracatheter Q8H     levothyroxine (SYNTHROID/LEVOTHROID) tablet 75 mcg  75 mcg Oral Daily     simvastatin (ZOCOR) tablet 20 mg  20 mg Oral QPM     cefTRIAXone  2 g Intravenous Q24H       Data   Results for orders placed or performed during the hospital encounter of 03/19/17 (from the past 24 hour(s))   US Breast Right    Narrative    ULTRASOUND RIGHT  BREAST  3/21/2017    HISTORY: Possible abnormality in the right breast on recent CT scan.  History of right lumpectomy. Patient reports radiation therapy  in  .    COMPARISON:  CT scan 3/19/2017. Mammogram 2016.    FINDINGS:  No suspicious findings in the subareolar areola right  breast in the region of concern on the mammogram.  Mild skin  thickening from prior radiation therapy. It is likely that the density  in the mammogram is related to scar tissue from prior lumpectomy.      Impression    IMPRESSION: BI-RADS CATEGORY: 1 -  NEGATIVE    RECOMMENDED FOLLOW-UP: Annual Mammography or as per oncology.    KATINA URIBE MD   Echocardiogram Complete    Narrative    [id number]  [facility]  [id number]  [id number]^MY^MATTHEW^YARELY           [facility]  Echocardiography Laboratory  [address] [place] [address]        Name: KATINA SORTO  MRN: [id number]  : 1934  Study Date: 2017 02:30 PM  Age: 82 yrs  Gender: Female  Patient Location: [facility]  Reason For Study: Murmur  Ordering Physician: MATTHEW PEPE  Referring Physician: Zoe North Pitcher  Performed By: Marilu Shaw RDCS     BSA: 2.1 m2  Height: 61 in  Weight: 268 lb  _____________________________________________________________________________  __        Procedure  Complete Portable Echo Adult.  _____________________________________________________________________________  __        Interpretation Summary     Hyperdynamic left ventricular function  The visual ejection fraction is estimated at >70%.  Mild valvular aortic stenosis.  The study was technically difficult. There is no comparison study available.  _____________________________________________________________________________  __        Left Ventricle  The left ventricle is normal in size. There is mild concentric left  ventricular hypertrophy. Hyperdynamic left ventricular function. The visual  ejection fraction is estimated at >70%. Grade I or early diastolic  dysfunction. No regional wall motion abnormalities noted.     Right Ventricle  The right ventricle is normal size. The right ventricular systolic function  is  normal.     Atria  Normal left atrial size. Right atrial size is normal. There is no color  Doppler evidence of an atrial shunt.        Mitral Valve  There is mild mitral annular calcification. There is no mitral regurgitation  noted.     Tricuspid Valve  No tricuspid regurgitation. Small IVC (<1.5cm) with normal respiratory  collapse; right atrial pressure is estimated at 0-5mmHg. Right ventricular  systolic pressure could not be approximated due to inadequate tricuspid  regurgitation.     Aortic Valve  The aortic valve is trileaflet. No aortic regurgitation is present. Mild  valvular aortic stenosis. The mean AoV pressure gradient is 20.1 mmHg.     Pulmonic Valve  The pulmonic valve is not well seen, but is grossly normal.     Vessels  The aortic root is normal size.     Pericardium  There is no pericardial effusion. Small left pleural effusion.        Rhythm  The rhythm was normal sinus.  _____________________________________________________________________________  __     MMode/2D Measurements & Calculations  IVSd: 1.1 cm  LVIDd: 4.1 cm  LVIDs: 2.1 cm  LVPWd: 1.2 cm  FS: 48.0 %  EDV(Teich): 75.0 ml  ESV(Teich): 15.2 ml  LV mass(C)d: 161.1 grams  Ao root diam: 2.8 cm  LA dimension: 4.3 cm  asc Aorta Diam: 3.3 cm  LA/Ao: 1.5  LVOT diam: 2.3 cm  LVOT area: 4.1 cm2  LA Volume (BP): 63.2 ml  LA Volume Index (BP): 29.5 ml/m2           Doppler Measurements & Calculations  MV E max surendra: 93.5 cm/sec  MV A max surendra: 130.2 cm/sec  MV E/A: 0.72  MV dec time: 0.31 sec  Ao V2 max: 311.3 cm/sec  Ao max P.8 mmHg  Ao V2 mean: 212.0 cm/sec  Ao mean P.1 mmHg  Ao V2 VTI: 62.0 cm  YOLIE(I,D): 1.6 cm2  YOLIE(V,D): 1.5 cm2  LV V1 max P.5 mmHg  LV V1 max: 117.7 cm/sec  LV V1 VTI: 24.3 cm  SV(LVOT): 99.4 ml  YOLIE Index (cm2/m2): 0.75  Lateral E/e': 13.6  Medial E/e': 10.6              _____________________________________________________________________________  __        Report approved by: Donovan Montiel 2017  03:38 PM

## 2017-03-22 NOTE — PLAN OF CARE
Problem: Goal Outcome Summary  Goal: Goal Outcome Summary  PT: Pt CGA supine > sit; CGA sit <> stand with FWW; amb x125' x2 with FWW and CGA progressing to close SBA; performed x3 steps x2 reps with 1 hand rail and SEC with CGA.     Recommend home with HHPT once medically stable.

## 2017-03-22 NOTE — PLAN OF CARE
Problem: Goal Outcome Summary  Goal: Goal Outcome Summary  Outcome: Improving  AVSS asist of 1 with walker. Ambulated to BR multiple times this shift. Denies pain. Abdomen starting to get firm again. Tender on palpation. 3+4+ edema in Lower extremities. Plan for IR tomorrow to offload abdominal fluid. Will continue to monitor.

## 2017-03-22 NOTE — PLAN OF CARE
Problem: Goal Outcome Summary  Goal: Goal Outcome Summary  Outcome: No Change  VSS. RA. A&Ox4. Denies pain. +2-3 BLE edema. Reddened panis, miconzole powder used. BM this morning. Up Ax1 GB walker. Abd obese, soft, tender. Awaiting to hear if pt will have paracentesis today or not. GYN/ONC following, possible outpt surgery.     [id number]: no paracentesis today. Plan for pt to see Dr. Ruiz tomorrow. Then possible d/c. Cytology results: negative, plan for surgery still next Thursday 3/30 as outpt. Bladder scanned for 536cc, straight cathed only 150cc, must have been false reading. Ambulated with therapy in halls Ax1 GB walker. Rocephin continued.

## 2017-03-23 VITALS
OXYGEN SATURATION: 93 % | WEIGHT: 268.96 LBS | HEART RATE: 91 BPM | BODY MASS INDEX: 50.78 KG/M2 | TEMPERATURE: 97.7 F | DIASTOLIC BLOOD PRESSURE: 93 MMHG | SYSTOLIC BLOOD PRESSURE: 142 MMHG | RESPIRATION RATE: 14 BRPM | HEIGHT: 61 IN

## 2017-03-23 PROBLEM — R19.00 PELVIC MASS: Status: ACTIVE | Noted: 2017-03-23

## 2017-03-23 PROBLEM — I89.0 LYMPHEDEMA OF BOTH LOWER EXTREMITIES: Status: ACTIVE | Noted: 2017-03-23

## 2017-03-23 PROBLEM — I10 HTN (HYPERTENSION): Status: ACTIVE | Noted: 2017-03-23

## 2017-03-23 PROBLEM — R18.8 ASCITES: Status: ACTIVE | Noted: 2017-03-23

## 2017-03-23 PROBLEM — E66.01 MORBID OBESITY WITH BMI OF 50.0-59.9, ADULT (H): Status: ACTIVE | Noted: 2017-03-23

## 2017-03-23 LAB
ANION GAP SERPL CALCULATED.3IONS-SCNC: 7 MMOL/L (ref 3–14)
BUN SERPL-MCNC: 18 MG/DL (ref 7–30)
CALCIUM SERPL-MCNC: 9.8 MG/DL (ref 8.5–10.1)
CHLORIDE SERPL-SCNC: 102 MMOL/L (ref 94–109)
CO2 SERPL-SCNC: 28 MMOL/L (ref 20–32)
CREAT SERPL-MCNC: 0.66 MG/DL (ref 0.52–1.04)
ERYTHROCYTE [DISTWIDTH] IN BLOOD BY AUTOMATED COUNT: 14.7 % (ref 10–15)
GFR SERPL CREATININE-BSD FRML MDRD: 85 ML/MIN/1.7M2
GLUCOSE SERPL-MCNC: 122 MG/DL (ref 70–99)
HCT VFR BLD AUTO: 40.1 % (ref 35–47)
HGB BLD-MCNC: 13.2 G/DL (ref 11.7–15.7)
MCH RBC QN AUTO: 30.3 PG (ref 26.5–33)
MCHC RBC AUTO-ENTMCNC: 32.9 G/DL (ref 31.5–36.5)
MCV RBC AUTO: 92 FL (ref 78–100)
PLATELET # BLD AUTO: 273 10E9/L (ref 150–450)
POTASSIUM SERPL-SCNC: 4 MMOL/L (ref 3.4–5.3)
RBC # BLD AUTO: 4.36 10E12/L (ref 3.8–5.2)
SODIUM SERPL-SCNC: 137 MMOL/L (ref 133–144)
WBC # BLD AUTO: 8.6 10E9/L (ref 4–11)

## 2017-03-23 PROCEDURE — 99239 HOSP IP/OBS DSCHRG MGMT >30: CPT | Performed by: HOSPITALIST

## 2017-03-23 PROCEDURE — 25000132 ZZH RX MED GY IP 250 OP 250 PS 637: Mod: GY | Performed by: INTERNAL MEDICINE

## 2017-03-23 PROCEDURE — 36415 COLL VENOUS BLD VENIPUNCTURE: CPT | Performed by: HOSPITALIST

## 2017-03-23 PROCEDURE — 85027 COMPLETE CBC AUTOMATED: CPT | Performed by: HOSPITALIST

## 2017-03-23 PROCEDURE — A9270 NON-COVERED ITEM OR SERVICE: HCPCS | Mod: GY | Performed by: INTERNAL MEDICINE

## 2017-03-23 PROCEDURE — 80048 BASIC METABOLIC PNL TOTAL CA: CPT | Performed by: HOSPITALIST

## 2017-03-23 RX ADMIN — LEVOTHYROXINE SODIUM 75 MCG: 75 TABLET ORAL at 08:49

## 2017-03-23 NOTE — PROGRESS NOTES
I discussed anticipated surgery with patient next Thursday.  I discussed risks and benefits of surgery with the patient and answered all her questions.  She is scheduled for a robotic assisted laparoscopic BSO, possible staging, possible laparotomy.  She can be discharged to home from my perspective.  She does not need to be on antibiotics on discharge.    ZION Ruiz M.D.  [phone]

## 2017-03-23 NOTE — PLAN OF CARE
Problem: Goal Outcome Summary  Goal: Goal Outcome Summary  Stable vital, denies pain, alert and oriented, pt will discharge today after seeing Dr hudson, plan for surgery on 3/30, continue to monitor.

## 2017-03-23 NOTE — PLAN OF CARE
Problem: Goal Outcome Summary  Goal: Goal Outcome Summary  PT- Per plan established by the Physical Therapist, according to functional mobility the discharge recommendation is home with [facility]. PT: pt sitting in chair upon arrival. Pt refused PT due to being discharged today and coming back next week for surgery.      Pt discharge today 3/23 to home with [facility]. Pt goals not met.

## 2017-03-23 NOTE — PLAN OF CARE
Problem: Goal Outcome Summary  Goal: Goal Outcome Summary  Outcome: Improving  Up with SBA and walker. Mobility improving. No dizziness or light-headedness. Abdomen soft, slight tenderness, no nausea. Denies pain. Poor appetite.

## 2017-03-23 NOTE — PROGRESS NOTES
Care Transition Initial Assessment - RN        Met with: Patient and Family.    DATA   Active Problems:    Abdominal pain       Cognitive Status: awake, alert and confused.        Contact information and PCP information verified: Yes    Lives With: spouse  Living Arrangements: house (split level)                   Insurance concerns: No Insurance issues identified    ASSESSMENT  Patient currently receives the following services:  None        Identified issues/concerns regarding health management: Needs more strengthening at d/c.    PLAN  Financial costs for the patient include N/A.  Has used [facility] in the past, and wishes to use them again. Called [phone], and spoke to intake who states they cannot staff for her to have either a HHC RN or PT, so patient and daughter are amenable to have [facility] to provide this. Referral sent to [facility].     Patient/family is agreeable to the plan?  Yes  Patient anticipates discharging to home w/[facility].        Patient anticipates needs for home equipment: Does not know    Plan/Disposition: Home   Appointments: Returning for surg on 3/30/17.      Care  (CTS) will continue to follow as needed.

## 2017-03-25 LAB
BACTERIA SPEC CULT: NO GROWTH
MICRO REPORT STATUS: NORMAL
SPECIMEN SOURCE: NORMAL

## 2017-03-26 NOTE — DISCHARGE SUMMARY
DATE OF ADMISSION 03/19/2017   DATE OF DISCHARGE:  03/23/2017       PRIMARY CARE PROVIDER:  [facility]      HISTORY OF PRESENT ILLNESS:  Please refer to the H&P note, Gynecology Oncology consult note, Hematology/Oncology consult notes for the details of this presentation.  In brief, Jovita Marshall is an 82-year-old woman with past medical history significant for hypertension, hypothyroidism, hyperlipidemia, history of breast cancer, and morbid obesity, who presented to the Emergency Department with increasing abdominal pain and mild lower extremity edema.  The patient was evaluated in the Emergency Department.  She underwent an abdominal CT scan, which showed large complex pelvic mass measuring about 15 cm.  That triggered a consult with Gynecology Oncology team.  The patient was also evaluated by a hematologist.  Upon further evaluation, the patient's abdominal mass was thought to be secondary to ovarian neoplasm versus large dermoid cyst.  After evaluation by Gynecology Oncology team, tumor markers were sent, which was also elevated.  The patient was discharged with followup in Gynecology Oncology Clinic for further evaluation and possible restaging and discussing further treatment with patient.  Other chronic problems include:      1.  Chronic kidney disease, stage III that was stable.   2.  Hypothyroidism.  She was maintained on Synthroid.   3.  Hyperlipidemia.  She was maintained on a statin, Zocor.      CODE STATUS:  Full code.      IMPORTANT RESULTS:  Please refer to Epic for CT scan findings and tumor markers.      LABORATORY RESULTS/IMAGING STUDIES:  All in Epic.        DISCHARGE INSTRUCTIONS AND FOLLOWUP:  The patient will follow up with her primary care for routine care.  She will be followed up by Gynecology Oncology team as an outpatient.  An appointment was set up prior to patient discharge.       DISCHARGE DISPOSITION:  Discharged to home.      DISCHARGE MEDICATIONS:  Please look at Epic  for the details.        CONSULTATIONS:  Oncology, Hematology, Gynecology Oncology, Physical Therapy and Occupational Therapy.      DISCHARGE CONDITION:  Stable.        PHYSICAL EXAMINATION:     VITAL SIGNS:  Blood pressure 142/93, pulse 91, temperature 36.5, oxygen saturations 93% on room air.  Respiratory rate 14.   CONSTITUTION:  Alert, oriented, in no distress, comfortably sitting up in a chair.  Family at bedside.   LUNGS:  Clear to auscultation bilaterally.  No wheezing or crackles.   HEART:  Regular rhythm and rate.  There is systolic ejection murmur 3/6, best heard at the second sternal border.   ABDOMEN:  Soft, protuberant, diffusely tender, no guarding, no rigidity.  Good bowel sounds.  She has an umbilical hernia in the epigastric region which is easily reducible.   EXTREMITIES:  2+ bilateral edema.   NEUROLOGY:  Cranial nerves II-XII grossly intact.   PSYCHIATRIC:  Stable.  Normal speech content and thought process.      Discharge time:  Discharge process taking greater than 30 minutes.         RYAN MANSFIELD MD             D: 2017 12:13   T: 2017 17:40   MT: MERRITT#[id number]      Name:     KATINA SORTO   MRN:      [id number]        Account:        [id number]   :      1934           Admit Date:                                       Discharge Date: 2017      Document: [id number]       cc: [facility]

## 2017-03-27 LAB
BACTERIA SPEC CULT: NORMAL
Lab: NORMAL
MICRO REPORT STATUS: NORMAL
SPECIMEN SOURCE: NORMAL

## 2017-03-28 NOTE — H&P (VIEW-ONLY)
DATE OF ADMISSION:  03/19/2017      CHIEF COMPLAINT:  Abdominal pain.      HISTORY OF PRESENT ILLNESS:  Jovita Marshall is an 82-year-old white female with a history of hypertension, hypothyroidism, hyperlipidemia who presents with abdominal pain.  The patient states that she has noticed increased abdominal distention as well as lower extremity edema since January after her hip surgery.  However, last night the patient had gas pains in her abdomen which were quite severe.  She tried taking antacids accompanying the gas pains which were diffuse in nature.  She had nausea as well as had emesis x2 which was mainly of food particles.  She also had some loose stools twice which were nonbloody, however, her pain was persistent and hence she presented to the ER for further evaluation.  In the ER over here, the patient denies any chest pain.  She denies any shortness of breath.  She does endorse some lightheadedness.  Denies any fevers or chills.  She was seen by Dr. Cleveland and she had an elevated white cell count which prompted a CT of the abdomen and pelvis, which showed a large complex pelvic mass measuring 15 cm in greatest diameter.  The mass is primarily cystic but does have some lipid component and some calcifications.  It could be a large dermoid cyst, however, there is a large amount of ascites and based on the size, it is worrisome for an ovarian neoplasm.  I am asked to admit her for further evaluation.      PAST MEDICAL HISTORY:  Significant for hypertension, hyperlipidemia, hypothyroidism.      PAST SURGICAL HISTORY:  Significant for hysterectomy, preserving her ovaries in 1959.  She does have a history of breast cancer with right lumpectomy and radiation treatment, most recent mammogram was in 11/2016, the results of which are unknown.      FAMILY HISTORY:  Her mother had Dong's disease.      OUTPATIENT MEDICATIONS: Lasix, chlorthalidone, atenolol, Synthroid, and Zocor.      ALLERGIES:  No known drug allergies.       REVIEW OF SYSTEMS:  As mentioned in the HPI.  All other systems are extensively reviewed and deemed unremarkable and negative.      PHYSICAL EXAMINATION:   VITAL SIGNS:  Temperature is 97.9, respiratory rate is 16.  Blood pressure is 146/82.  O2 sat is 93% on room air.   GENERAL:  She is alert, awake, oriented, coherent, nontoxic, in no acute distress.  She is accompanied by her daughter.   HEENT:  Pupils equal, round, reactive to light.  Pharynx there is no exudate noted.  There is no tender anterior cervical lymphadenopathy.   LUNGS:  She has a few crackles in her bases bilaterally.   HEART:  Regular rate, S1, S2 normal.  She has a 3/6 systolic murmur.   ABDOMEN:  Soft, protuberant, diffusely tender, no guarding, no rigidity, with good bowel sounds.  She has an umbilical hernia in her epigastric region which is easily reducible.   EXTREMITIES:  She has 3+ edema bilaterally.   NEUROLOGIC:  Cranial nerves II-XII are grossly within normal limits.  She moves all her extremities.      LABORATORY DATA:  Labwork here shows a sodium 140, potassium 3.7, chloride 103, bicarbonate 27, BUN 19, creatinine 0.90, GFR 60, calcium 9.6, anion gap 10, albumin 3.1, total protein 6.1, total bilirubin 0.8.  Alkaline phosphatase 34.  LFTs are within normal limits.  Lipase is 51.  Glucose level of 177.  On a CBC:  White cell count is 14.4 with an absolute neutrophil count of 13.7.      IMAGING:  She had the following imaging studies:  CT of abdomen and pelvis with contrast which showed a large complex pelvic mass measuring 15 cm in greatest diameter.  The mass is primarily cystic but does have some lipid component and some calcifications.  This could be simply a large dermoid cyst, however, there is a large amount of ascites and based on the size of the mass the finding is worrisome for ovarian neoplasm stellate 2 cm lesion in the retroareolar region of the right breast, which is indeterminate, breast neoplasm cannot be excluded.   Diagnostic mammogram and ultrasound recommended for further evaluation.     Ventral hernia above the level of the umbilicus containing some omentum and some soft tissue thickening which could represent some focal inflammatory change.  There are low density lesions in both kidneys which are too small to characterize, most likely simple cyst.      ASSESSMENT AND PLAN:   1.  Abdominal pain, likely due to large complex pelvic mass.  Will admit her as an inpatient.  Will consult Interventional Radiology for paracentesis and send off the ascitic fluid for cytology.  In addition, will check tumor markers such as .  Will consult Oncology.   2.  Breast lesion on the right as on CT scan.  Will consult Oncology given her history of breast cancer.  Will hold off on further diagnostic at this point in time.    3.  Will place her on PCDs for DVT prophylaxis.      CODE STATUS:  Full code.      She will be admitted as an inpatient.         MATTHEW PEPE MD             D: 2017 15:15   T: 2017 15:46   MT: JOSE C      Name:     KATINA SORTO   MRN:      [id number]        Account:      [id number]   :      1934           Admitted:     [id number]      Document: [id number]

## 2017-03-30 ENCOUNTER — HOSPITAL ENCOUNTER (OUTPATIENT)
Facility: CLINIC | Age: 82
Discharge: HOME | End: 2017-03-31
Attending: OBSTETRICS & GYNECOLOGY | Admitting: OBSTETRICS & GYNECOLOGY
Payer: MEDICARE

## 2017-03-30 ENCOUNTER — SURGERY (OUTPATIENT)
Age: 82
End: 2017-03-30

## 2017-03-30 ENCOUNTER — TRANSFERRED RECORDS (OUTPATIENT)
Dept: HEALTH INFORMATION MANAGEMENT | Facility: CLINIC | Age: 82
End: 2017-03-30

## 2017-03-30 ENCOUNTER — ANESTHESIA EVENT (OUTPATIENT)
Dept: SURGERY | Facility: CLINIC | Age: 82
End: 2017-03-30
Payer: MEDICARE

## 2017-03-30 ENCOUNTER — ANESTHESIA (OUTPATIENT)
Dept: SURGERY | Facility: CLINIC | Age: 82
End: 2017-03-30
Payer: MEDICARE

## 2017-03-30 DIAGNOSIS — I89.0 LYMPHEDEMA OF BOTH LOWER EXTREMITIES: ICD-10-CM

## 2017-03-30 DIAGNOSIS — R19.00 PELVIC MASS: Primary | ICD-10-CM

## 2017-03-30 LAB
BASE EXCESS BLDA CALC-SCNC: 1.6 MMOL/L
HCO3 BLD-SCNC: 26 MMOL/L (ref 21–28)
O2/TOTAL GAS SETTING VFR VENT: ABNORMAL %
OXYHGB MFR BLD: 99 % (ref 92–100)
PCO2 BLD: 42 MM HG (ref 35–45)
PH BLD: 7.41 PH (ref 7.35–7.45)
PO2 BLD: 114 MM HG (ref 80–105)
POTASSIUM SERPL-SCNC: 4 MMOL/L (ref 3.4–5.3)

## 2017-03-30 PROCEDURE — 93010 ELECTROCARDIOGRAM REPORT: CPT | Performed by: INTERNAL MEDICINE

## 2017-03-30 PROCEDURE — 71000015 ZZH RECOVERY PHASE 1 LEVEL 2 EA ADDTL HR: Performed by: OBSTETRICS & GYNECOLOGY

## 2017-03-30 PROCEDURE — 25000128 H RX IP 250 OP 636: Performed by: ANESTHESIOLOGY

## 2017-03-30 PROCEDURE — 93005 ELECTROCARDIOGRAM TRACING: CPT

## 2017-03-30 PROCEDURE — 27211024 ZZHC OR SUPPLY OTHER OPNP: Performed by: OBSTETRICS & GYNECOLOGY

## 2017-03-30 PROCEDURE — 37000009 ZZH ANESTHESIA TECHNICAL FEE, EACH ADDTL 15 MIN: Performed by: OBSTETRICS & GYNECOLOGY

## 2017-03-30 PROCEDURE — 82805 BLOOD GASES W/O2 SATURATION: CPT | Performed by: ANESTHESIOLOGY

## 2017-03-30 PROCEDURE — 40000065 ZZH STATISTIC EKG NON-CHARGEABLE

## 2017-03-30 PROCEDURE — 40000169 ZZH STATISTIC PRE-PROCEDURE ASSESSMENT I: Performed by: OBSTETRICS & GYNECOLOGY

## 2017-03-30 PROCEDURE — 40000275 ZZH STATISTIC RCP TIME EA 10 MIN

## 2017-03-30 PROCEDURE — 88331 PATH CONSLTJ SURG 1 BLK 1SPC: CPT | Performed by: OBSTETRICS & GYNECOLOGY

## 2017-03-30 PROCEDURE — 25000125 ZZHC RX 250: Performed by: ANESTHESIOLOGY

## 2017-03-30 PROCEDURE — 88112 CYTOPATH CELL ENHANCE TECH: CPT | Mod: 26 | Performed by: OBSTETRICS & GYNECOLOGY

## 2017-03-30 PROCEDURE — 88305 TISSUE EXAM BY PATHOLOGIST: CPT | Performed by: OBSTETRICS & GYNECOLOGY

## 2017-03-30 PROCEDURE — 25800025 ZZH RX 258: Performed by: OBSTETRICS & GYNECOLOGY

## 2017-03-30 PROCEDURE — 25800025 ZZH RX 258: Performed by: ANESTHESIOLOGY

## 2017-03-30 PROCEDURE — 88305 TISSUE EXAM BY PATHOLOGIST: CPT | Mod: 26 | Performed by: OBSTETRICS & GYNECOLOGY

## 2017-03-30 PROCEDURE — A9270 NON-COVERED ITEM OR SERVICE: HCPCS | Mod: GY | Performed by: ANESTHESIOLOGY

## 2017-03-30 PROCEDURE — 84132 ASSAY OF SERUM POTASSIUM: CPT | Performed by: ANESTHESIOLOGY

## 2017-03-30 PROCEDURE — 00000155 ZZHCL STATISTIC H-CELL BLOCK W/STAIN: Performed by: OBSTETRICS & GYNECOLOGY

## 2017-03-30 PROCEDURE — 25000132 ZZH RX MED GY IP 250 OP 250 PS 637: Mod: GY | Performed by: ANESTHESIOLOGY

## 2017-03-30 PROCEDURE — 88307 TISSUE EXAM BY PATHOLOGIST: CPT | Performed by: OBSTETRICS & GYNECOLOGY

## 2017-03-30 PROCEDURE — 88331 PATH CONSLTJ SURG 1 BLK 1SPC: CPT | Mod: 26 | Performed by: OBSTETRICS & GYNECOLOGY

## 2017-03-30 PROCEDURE — 27210794 ZZH OR GENERAL SUPPLY STERILE: Performed by: OBSTETRICS & GYNECOLOGY

## 2017-03-30 PROCEDURE — 36600 WITHDRAWAL OF ARTERIAL BLOOD: CPT | Mod: XU

## 2017-03-30 PROCEDURE — 71000014 ZZH RECOVERY PHASE 1 LEVEL 2 FIRST HR: Performed by: OBSTETRICS & GYNECOLOGY

## 2017-03-30 PROCEDURE — 25000125 ZZHC RX 250: Performed by: NURSE ANESTHETIST, CERTIFIED REGISTERED

## 2017-03-30 PROCEDURE — 25000128 H RX IP 250 OP 636: Performed by: NURSE ANESTHETIST, CERTIFIED REGISTERED

## 2017-03-30 PROCEDURE — 37000008 ZZH ANESTHESIA TECHNICAL FEE, 1ST 30 MIN: Performed by: OBSTETRICS & GYNECOLOGY

## 2017-03-30 PROCEDURE — 25000125 ZZHC RX 250: Performed by: OBSTETRICS & GYNECOLOGY

## 2017-03-30 PROCEDURE — 88307 TISSUE EXAM BY PATHOLOGIST: CPT | Mod: 26 | Performed by: OBSTETRICS & GYNECOLOGY

## 2017-03-30 PROCEDURE — 36000087 ZZH SURGERY LEVEL 8 EA 15 ADDTL MIN: Performed by: OBSTETRICS & GYNECOLOGY

## 2017-03-30 PROCEDURE — 36000085 ZZH SURGERY LEVEL 8 1ST 30 MIN: Performed by: OBSTETRICS & GYNECOLOGY

## 2017-03-30 PROCEDURE — 25000566 ZZH SEVOFLURANE, EA 15 MIN: Performed by: OBSTETRICS & GYNECOLOGY

## 2017-03-30 PROCEDURE — 36415 COLL VENOUS BLD VENIPUNCTURE: CPT | Performed by: ANESTHESIOLOGY

## 2017-03-30 PROCEDURE — 88321 CONSLTJ&REPRT SLD PREP ELSWR: CPT | Performed by: OBSTETRICS & GYNECOLOGY

## 2017-03-30 PROCEDURE — P9041 ALBUMIN (HUMAN),5%, 50ML: HCPCS | Performed by: ANESTHESIOLOGY

## 2017-03-30 PROCEDURE — 00000159 ZZHCL STATISTIC H-SEND OUTS PREP: Performed by: OBSTETRICS & GYNECOLOGY

## 2017-03-30 PROCEDURE — 88112 CYTOPATH CELL ENHANCE TECH: CPT | Performed by: OBSTETRICS & GYNECOLOGY

## 2017-03-30 PROCEDURE — 27210995 ZZH RX 272: Performed by: OBSTETRICS & GYNECOLOGY

## 2017-03-30 RX ORDER — MAGNESIUM HYDROXIDE 1200 MG/15ML
LIQUID ORAL PRN
Status: DISCONTINUED | OUTPATIENT
Start: 2017-03-30 | End: 2017-03-30 | Stop reason: HOSPADM

## 2017-03-30 RX ORDER — PROPOFOL 10 MG/ML
5-75 INJECTION, EMULSION INTRAVENOUS CONTINUOUS
Status: DISCONTINUED | OUTPATIENT
Start: 2017-03-30 | End: 2017-03-30 | Stop reason: HOSPADM

## 2017-03-30 RX ORDER — BUPIVACAINE HYDROCHLORIDE AND EPINEPHRINE 5; 5 MG/ML; UG/ML
INJECTION, SOLUTION PERINEURAL PRN
Status: DISCONTINUED | OUTPATIENT
Start: 2017-03-30 | End: 2017-03-30 | Stop reason: HOSPADM

## 2017-03-30 RX ORDER — PROCHLORPERAZINE MALEATE 5 MG
5 TABLET ORAL EVERY 6 HOURS PRN
Status: DISCONTINUED | OUTPATIENT
Start: 2017-03-30 | End: 2017-03-31 | Stop reason: HOSPADM

## 2017-03-30 RX ORDER — DEXAMETHASONE SODIUM PHOSPHATE 4 MG/ML
INJECTION, SOLUTION INTRA-ARTICULAR; INTRALESIONAL; INTRAMUSCULAR; INTRAVENOUS; SOFT TISSUE PRN
Status: DISCONTINUED | OUTPATIENT
Start: 2017-03-30 | End: 2017-03-30

## 2017-03-30 RX ORDER — PROPOFOL 10 MG/ML
INJECTION, EMULSION INTRAVENOUS CONTINUOUS PRN
Status: DISCONTINUED | OUTPATIENT
Start: 2017-03-30 | End: 2017-03-30

## 2017-03-30 RX ORDER — HYDROCODONE BITARTRATE AND ACETAMINOPHEN 5; 325 MG/1; MG/1
1-2 TABLET ORAL EVERY 4 HOURS PRN
Qty: 30 TABLET | Refills: 0 | Status: SHIPPED | OUTPATIENT
Start: 2017-03-30

## 2017-03-30 RX ORDER — SODIUM CHLORIDE, SODIUM LACTATE, POTASSIUM CHLORIDE, CALCIUM CHLORIDE 600; 310; 30; 20 MG/100ML; MG/100ML; MG/100ML; MG/100ML
INJECTION, SOLUTION INTRAVENOUS CONTINUOUS PRN
Status: DISCONTINUED | OUTPATIENT
Start: 2017-03-30 | End: 2017-03-30

## 2017-03-30 RX ORDER — ONDANSETRON 2 MG/ML
INJECTION INTRAMUSCULAR; INTRAVENOUS PRN
Status: DISCONTINUED | OUTPATIENT
Start: 2017-03-30 | End: 2017-03-30

## 2017-03-30 RX ORDER — HYDROCODONE BITARTRATE AND ACETAMINOPHEN 5; 325 MG/1; MG/1
1-2 TABLET ORAL
Status: DISCONTINUED | OUTPATIENT
Start: 2017-03-30 | End: 2017-03-30

## 2017-03-30 RX ORDER — ONDANSETRON 4 MG/1
4 TABLET, ORALLY DISINTEGRATING ORAL EVERY 6 HOURS PRN
Status: DISCONTINUED | OUTPATIENT
Start: 2017-03-30 | End: 2017-03-31 | Stop reason: HOSPADM

## 2017-03-30 RX ORDER — FENTANYL CITRATE 50 UG/ML
INJECTION, SOLUTION INTRAMUSCULAR; INTRAVENOUS PRN
Status: DISCONTINUED | OUTPATIENT
Start: 2017-03-30 | End: 2017-03-30

## 2017-03-30 RX ORDER — HYDROCODONE BITARTRATE AND ACETAMINOPHEN 5; 325 MG/1; MG/1
1 TABLET ORAL EVERY 4 HOURS PRN
Status: DISCONTINUED | OUTPATIENT
Start: 2017-03-30 | End: 2017-03-31 | Stop reason: HOSPADM

## 2017-03-30 RX ORDER — PROPOFOL 10 MG/ML
INJECTION, EMULSION INTRAVENOUS PRN
Status: DISCONTINUED | OUTPATIENT
Start: 2017-03-30 | End: 2017-03-30

## 2017-03-30 RX ORDER — HYDROMORPHONE HYDROCHLORIDE 1 MG/ML
0.2 INJECTION, SOLUTION INTRAMUSCULAR; INTRAVENOUS; SUBCUTANEOUS
Status: DISCONTINUED | OUTPATIENT
Start: 2017-03-30 | End: 2017-03-31 | Stop reason: HOSPADM

## 2017-03-30 RX ORDER — ACETAMINOPHEN 325 MG/1
650 TABLET ORAL
Status: DISCONTINUED | OUTPATIENT
Start: 2017-03-30 | End: 2017-03-31 | Stop reason: HOSPADM

## 2017-03-30 RX ORDER — ONDANSETRON 4 MG/1
4 TABLET, ORALLY DISINTEGRATING ORAL
Status: DISCONTINUED | OUTPATIENT
Start: 2017-03-30 | End: 2017-03-30

## 2017-03-30 RX ORDER — NEOSTIGMINE METHYLSULFATE 1 MG/ML
VIAL (ML) INJECTION PRN
Status: DISCONTINUED | OUTPATIENT
Start: 2017-03-30 | End: 2017-03-30

## 2017-03-30 RX ORDER — NALOXONE HYDROCHLORIDE 0.4 MG/ML
.1-.4 INJECTION, SOLUTION INTRAMUSCULAR; INTRAVENOUS; SUBCUTANEOUS
Status: DISCONTINUED | OUTPATIENT
Start: 2017-03-30 | End: 2017-03-31 | Stop reason: HOSPADM

## 2017-03-30 RX ORDER — FENTANYL CITRATE 50 UG/ML
50 INJECTION, SOLUTION INTRAMUSCULAR; INTRAVENOUS ONCE
Status: COMPLETED | OUTPATIENT
Start: 2017-03-30 | End: 2017-03-30

## 2017-03-30 RX ORDER — SODIUM CHLORIDE, SODIUM LACTATE, POTASSIUM CHLORIDE, CALCIUM CHLORIDE 600; 310; 30; 20 MG/100ML; MG/100ML; MG/100ML; MG/100ML
INJECTION, SOLUTION INTRAVENOUS CONTINUOUS
Status: DISCONTINUED | OUTPATIENT
Start: 2017-03-30 | End: 2017-03-30 | Stop reason: HOSPADM

## 2017-03-30 RX ORDER — ONDANSETRON 2 MG/ML
4 INJECTION INTRAMUSCULAR; INTRAVENOUS EVERY 6 HOURS PRN
Status: DISCONTINUED | OUTPATIENT
Start: 2017-03-30 | End: 2017-03-31 | Stop reason: HOSPADM

## 2017-03-30 RX ORDER — AMOXICILLIN 250 MG
1-2 CAPSULE ORAL 2 TIMES DAILY PRN
Qty: 30 TABLET | Refills: 0 | Status: SHIPPED | OUTPATIENT
Start: 2017-03-30

## 2017-03-30 RX ORDER — LIDOCAINE HYDROCHLORIDE 20 MG/ML
INJECTION, SOLUTION INFILTRATION; PERINEURAL PRN
Status: DISCONTINUED | OUTPATIENT
Start: 2017-03-30 | End: 2017-03-30

## 2017-03-30 RX ORDER — GLYCOPYRROLATE 0.2 MG/ML
INJECTION, SOLUTION INTRAMUSCULAR; INTRAVENOUS PRN
Status: DISCONTINUED | OUTPATIENT
Start: 2017-03-30 | End: 2017-03-30

## 2017-03-30 RX ORDER — ALBUTEROL SULFATE 90 UG/1
AEROSOL, METERED RESPIRATORY (INHALATION) PRN
Status: DISCONTINUED | OUTPATIENT
Start: 2017-03-30 | End: 2017-03-30

## 2017-03-30 RX ORDER — ALBUMIN, HUMAN INJ 5% 5 %
12.5 SOLUTION INTRAVENOUS ONCE
Status: COMPLETED | OUTPATIENT
Start: 2017-03-30 | End: 2017-03-30

## 2017-03-30 RX ADMIN — FENTANYL CITRATE 50 MCG: 50 INJECTION, SOLUTION INTRAMUSCULAR; INTRAVENOUS at 18:15

## 2017-03-30 RX ADMIN — ROCURONIUM BROMIDE 10 MG: 10 INJECTION INTRAVENOUS at 14:36

## 2017-03-30 RX ADMIN — PROPOFOL 50 MCG/KG/MIN: 10 INJECTION, EMULSION INTRAVENOUS at 16:09

## 2017-03-30 RX ADMIN — ROCURONIUM BROMIDE 30 MG: 10 INJECTION INTRAVENOUS at 14:10

## 2017-03-30 RX ADMIN — PHENYLEPHRINE HYDROCHLORIDE 150 MCG: 10 INJECTION, SOLUTION INTRAMUSCULAR; INTRAVENOUS; SUBCUTANEOUS at 14:09

## 2017-03-30 RX ADMIN — ONDANSETRON 4 MG: 2 INJECTION INTRAMUSCULAR; INTRAVENOUS at 15:14

## 2017-03-30 RX ADMIN — SUCCINYLCHOLINE CHLORIDE 100 MG: 20 INJECTION, SOLUTION INTRAMUSCULAR; INTRAVENOUS at 13:56

## 2017-03-30 RX ADMIN — PHENYLEPHRINE HYDROCHLORIDE 100 MCG: 10 INJECTION, SOLUTION INTRAMUSCULAR; INTRAVENOUS; SUBCUTANEOUS at 14:01

## 2017-03-30 RX ADMIN — SODIUM CHLORIDE 1000 ML: 900 IRRIGANT IRRIGATION at 14:20

## 2017-03-30 RX ADMIN — FENTANYL CITRATE 100 MCG: 50 INJECTION, SOLUTION INTRAMUSCULAR; INTRAVENOUS at 13:56

## 2017-03-30 RX ADMIN — ROCURONIUM BROMIDE 20 MG: 10 INJECTION INTRAVENOUS at 14:45

## 2017-03-30 RX ADMIN — GLYCOPYRROLATE 0.8 MG: 0.2 INJECTION, SOLUTION INTRAMUSCULAR; INTRAVENOUS at 15:53

## 2017-03-30 RX ADMIN — LIDOCAINE HYDROCHLORIDE 1 ML: 10 INJECTION, SOLUTION EPIDURAL; INFILTRATION; INTRACAUDAL; PERINEURAL at 10:09

## 2017-03-30 RX ADMIN — SODIUM CHLORIDE, POTASSIUM CHLORIDE, SODIUM LACTATE AND CALCIUM CHLORIDE: 600; 310; 30; 20 INJECTION, SOLUTION INTRAVENOUS at 15:56

## 2017-03-30 RX ADMIN — FENTANYL CITRATE 50 MCG: 50 INJECTION, SOLUTION INTRAMUSCULAR; INTRAVENOUS at 17:40

## 2017-03-30 RX ADMIN — ALBUTEROL SULFATE 6 PUFF: 90 AEROSOL, METERED RESPIRATORY (INHALATION) at 14:03

## 2017-03-30 RX ADMIN — ROCURONIUM BROMIDE 10 MG: 10 INJECTION INTRAVENOUS at 14:20

## 2017-03-30 RX ADMIN — DEXAMETHASONE SODIUM PHOSPHATE 4 MG: 4 INJECTION, SOLUTION INTRAMUSCULAR; INTRAVENOUS at 14:34

## 2017-03-30 RX ADMIN — SODIUM CHLORIDE 1000 ML: 0.9 IRRIGANT IRRIGATION at 14:20

## 2017-03-30 RX ADMIN — SODIUM CHLORIDE, POTASSIUM CHLORIDE, SODIUM LACTATE AND CALCIUM CHLORIDE: 600; 310; 30; 20 INJECTION, SOLUTION INTRAVENOUS at 14:05

## 2017-03-30 RX ADMIN — SODIUM CHLORIDE, POTASSIUM CHLORIDE, SODIUM LACTATE AND CALCIUM CHLORIDE: 600; 310; 30; 20 INJECTION, SOLUTION INTRAVENOUS at 10:08

## 2017-03-30 RX ADMIN — PHENYLEPHRINE HYDROCHLORIDE 100 MCG: 10 INJECTION, SOLUTION INTRAMUSCULAR; INTRAVENOUS; SUBCUTANEOUS at 15:34

## 2017-03-30 RX ADMIN — PROPOFOL 50 MCG/KG/MIN: 10 INJECTION, EMULSION INTRAVENOUS at 18:10

## 2017-03-30 RX ADMIN — LIDOCAINE HYDROCHLORIDE 80 MG: 20 INJECTION, SOLUTION INFILTRATION; PERINEURAL at 13:56

## 2017-03-30 RX ADMIN — PHENYLEPHRINE HYDROCHLORIDE 0.25 MCG/KG/MIN: 10 INJECTION, SOLUTION INTRAMUSCULAR; INTRAVENOUS; SUBCUTANEOUS at 14:10

## 2017-03-30 RX ADMIN — BUPIVACAINE HYDROCHLORIDE AND EPINEPHRINE BITARTRATE 60 ML: 5; .005 INJECTION, SOLUTION PERINEURAL at 15:44

## 2017-03-30 RX ADMIN — PROPOFOL 40 MG: 10 INJECTION, EMULSION INTRAVENOUS at 16:07

## 2017-03-30 RX ADMIN — NEOSTIGMINE METHYLSULFATE 5 MG: 1 INJECTION INTRAMUSCULAR; INTRAVENOUS; SUBCUTANEOUS at 15:53

## 2017-03-30 RX ADMIN — ALBUMIN (HUMAN) 12.5 G: 12.5 SOLUTION INTRAVENOUS at 16:47

## 2017-03-30 RX ADMIN — PROPOFOL 100 MG: 10 INJECTION, EMULSION INTRAVENOUS at 13:56

## 2017-03-30 NOTE — PROGRESS NOTES
Admission medication history interview status for the 3/30/2017  admission is complete. See EPIC admission navigator for prior to admission medications     Medication history source reliability:Good    Medication history interview source(s):Patient    Medication history resources (including written lists, pill bottles, clinic record):Patient brought a list from home    Primary pharmacy.[facility]    Additional medication history information not noted on PTA med list :Patient mentioned she was taking Chlorthalidone, but this medication is currently stopped while taking Furosemide. (has been held for last 6 weeks)     Time spent in this activity: 40 minutes    Prior to Admission medications    Medication Sig Last Dose Taking? Auth Provider   miconazole (MICATIN; MICRO GUARD) 2 % powder Apply topically every hour as needed for other (topical candidiasis) 3/28/2017 at PRN Yes Alex Amador MD   ATENOLOL PO Take 50 mg by mouth every evening 3/29/2017 at PM Yes Unknown, Entered By History   VITAMIN D, CHOLECALCIFEROL, PO Take 2,000 Units by mouth daily 3/28/2017 at AM Yes Unknown, Entered By History   multivitamin, therapeutic with minerals (MULTI-VITAMIN) TABS tablet Take 1 tablet by mouth daily 3/28/2017 at AM Yes Unknown, Entered By History   SIMVASTATIN PO Take 20 mg by mouth every evening (0.5 x 40 mg tablet = 20 mg dose) 3/29/2017 at PM Yes Unknown, Entered By History   Potassium Chloride Obdulia CR (K-DUR PO) Take 20 mEq by mouth daily with food  3/29/2017 at AM Yes Unknown, Entered By History   LEVOTHYROXINE SODIUM PO Take 75 mcg by mouth daily 3/29/2017 at AM Yes Unknown, Entered By History   FUROSEMIDE PO Take 20 mg by mouth 2 times daily Morning and Noon 3/29/2017 at AM Yes Unknown, Entered By History   Acetaminophen (TYLENOL PO) Take 1,300 mg by mouth 2 times daily as needed for mild pain or fever  Past Month at PRN Yes Unknown, Entered By History   ASPIRIN PO Take 162 mg by  mouth daily (2 x 81 mg = 162 mg dose) 3/23/2017  Reported, Patient

## 2017-03-30 NOTE — PROCEDURES
POST OPERATIVE NOTE-IMMEDIATE :  Preoperative Diagnosis:  PELVIC MASS    Postoperative Diagnosis:  Same    NATIONAL GUIDELINE REFERENCED FOR TREATMENT PLANNING:NCCN    Procedures:  Robotic assisted laparoscopic  Bilateral salphingoophorectomy  Extensive lysis of adhesions    Prosthetic Devices:  None    Surgeon(s) and Assistants (if any):  Surgeon(s):  Vanessa Ruiz MD Casey, Ann Catherine, MD  Circulator: Trenton Tracey RN; Tiny Garner RN  Nurse Practitioner: Debbie Brown APRN CNP  Scrub Person: June Headley; Johanna Wilson; Hali Watkins    Anesthesia:  General    Drains:  none    Specimens:  bilateral fallopian tubes and ovaries, washings    Complications: none    Findings/Conclusions: Massive amount, >3L sebaceous fluid and hair from right pelvic mass suggesting a dermoid. Grossly normal appearing left ovary and tube. Extensive anterior peritoneal adhesions.     Estimated Blood Loss:  5cc    Condition on discharge from OR:  Satisfactory      Debbie Brown   On Behalf of  Surgeon(s):  Vanessa Ruiz MD Casey, Ann Catherine, MD

## 2017-03-30 NOTE — IP AVS SNAPSHOT
[address], [address]    [facility] [address]    Phone:  [phone]                                       After Visit Summary   3/30/2017    Jovita Marshall    MRN: [id number]           After Visit Summary Signature Page     I have received my discharge instructions, and my questions have been answered. I have discussed any challenges I see with this plan with the nurse or doctor.    ..........................................................................................................................................  Patient/Patient Representative Signature      ..........................................................................................................................................  Patient Representative Print Name and Relationship to Patient    ..................................................               ................................................  Date                                            Time    ..........................................................................................................................................  Reviewed by Signature/Title    ...................................................              ..............................................  Date                                                            Time

## 2017-03-30 NOTE — DISCHARGE SUMMARY
"HOSPITAL DISCHARGE SUMMARY    Patient Name: Jovita Marshall  YOB: 1934 Age: 82 year old  Medical Record Number: [id number]  Primary Physician: Zoe John  Phone: [phone]  Admission Date: 3/30/2017  Discharge Date: 3/31/17    Jovita Marshall  will be discharged from [facility] to Home.    PRINCIPAL DISCHARGE DIAGNOSIS: Pelvic mass    BRIEF HOSPITAL COURSE: This 82 year old female admitted following the below listed procedure. She had trouble weaning off the vent post operatively and was admitted overnight. She tolerated the procedure well. Uneventful post operative course and discharge to home on POD #1 with adequate pain control, tolerating orals, voiding and ambulating.    PROCEDURES PERFORMED DURING HOSPITALIZATION:   Robotic assisted laparoscopic  Bilateral salpingo-oophorectomy    COMPLICATIONS IN HOSPITAL: None    CONSULTATIONS:  None    PERTINENT FINDINGS/RESULTS AT DISCHARGE:   /50  Pulse 64  Temp 97.4  F (36.3  C) (Temporal)  Resp 20  Ht 1.702 m (5' 7\")  Wt 68.5 kg (151 lb)  SpO2 98%  BMI 23.65 kg/m2    Latest Laboratory Results:  Chem:  CBC RESULTS:   Recent Labs   Lab Test  03/23/17   1110   WBC  8.6   RBC  4.36   HGB  13.2   HCT  40.1   MCV  92   MCH  30.3   MCHC  32.9   RDW  14.7   PLT  273     Last Basic Metabolic Panel:  Lab Results   Component Value Date     03/23/2017      Lab Results   Component Value Date    POTASSIUM 4.0 03/30/2017     Lab Results   Component Value Date    CHLORIDE 102 03/23/2017     Lab Results   Component Value Date    ELLIOTT 9.8 03/23/2017     Lab Results   Component Value Date    CO2 28 03/23/2017     Lab Results   Component Value Date    BUN 18 03/23/2017     Lab Results   Component Value Date    CR 0.66 03/23/2017     Lab Results   Component Value Date     03/23/2017         IMPORTANT PENDING TEST RESULTS:  Pathology    CONDITION AT DISCHARGE:    Stabilized    DISCHARGE ORDERS  Current Discharge " Medication List      START taking these medications    Details   HYDROcodone-acetaminophen (NORCO) 5-325 MG per tablet Take 1-2 tablets by mouth every 4 hours as needed for other (Moderate to Severe Pain)  Qty: 30 tablet, Refills: 0    Associated Diagnoses: Pelvic mass      senna-docusate (SENOKOT-S;PERICOLACE) 8.6-50 MG per tablet Take 1-2 tablets by mouth 2 times daily as needed for constipation Take while on oral narcotics to prevent or treat constipation.  Qty: 30 tablet, Refills: 0    Comments: While on narcotics  Associated Diagnoses: Pelvic mass         CONTINUE these medications which have NOT CHANGED    Details   miconazole (MICATIN; MICRO GUARD) 2 % powder Apply topically every hour as needed for other (topical candidiasis)  Qty: 10 g, Refills: 0    Associated Diagnoses: Rash      ATENOLOL PO Take 50 mg by mouth every evening      VITAMIN D, CHOLECALCIFEROL, PO Take 2,000 Units by mouth daily      multivitamin, therapeutic with minerals (MULTI-VITAMIN) TABS tablet Take 1 tablet by mouth daily      SIMVASTATIN PO Take 20 mg by mouth every evening (0.5 x 40 mg tablet = 20 mg dose)      Potassium Chloride Obdulia CR (K-DUR PO) Take 20 mEq by mouth daily with food       LEVOTHYROXINE SODIUM PO Take 75 mcg by mouth daily      FUROSEMIDE PO Take 20 mg by mouth 2 times daily Morning and Noon      Acetaminophen (TYLENOL PO) Take 1,300 mg by mouth 2 times daily as needed for mild pain or fever       ASPIRIN PO Take 162 mg by mouth daily (2 x 81 mg = 162 mg dose)             DISCHARGE INSTRUCTION.      FOLLOW-UP: Jovita Marshall should see [facility] PRN.    Specialty follow-up: as above.     AFTER HOSPITAL RECOMMENDATIONS  As above.      Physician(s) in addition to primary physician who should receive a copy:  [facility]       Debbie Brown

## 2017-03-30 NOTE — IP AVS SNAPSHOT
MRN:[id number]                      After Visit Summary   3/30/2017    Jovita Marshall    MRN: [id number]           Thank you!     Thank you for choosing [facility] for your care. Our goal is always to provide you with excellent care. Hearing back from our patients is one way we can continue to improve our services. Please take a few minutes to complete the written survey that you may receive in the mail after you visit with us. Thank you!        Patient Information     Date Of Birth          5/9/1934        About your hospital stay     You were admitted on:  March 30, 2017 You last received care in the:  [place] [address] [facility]    You were discharged on:  March 31, 2017       Who to Call     For medical emergencies, please call 911.  For non-urgent questions about your medical care, please call your primary care provider or clinic, [phone]  For questions related to your surgery, please call your surgery clinic        Attending Provider     Provider Vanessa Rahman MD Oncology       Primary Care Provider Office Phone # Fax #    [facility] [phone] [address]  [facility] [address]        After Care Instructions     Discharge Instructions       Discharge instructions following your gynecologic procedure:  Returning to work/activities:  -Nothing per vagina for 8 weeks  -Typically patients can expect to return to light work within 2-4 weeks.  -No driving or operating machinery while taking prescription pain medication.  -You should avoid heavy lifting until your follow up visit. No lifting greater than 20 pounds for 2 weeks.     Diet:  -as tolerated    Pain:  -Motrin (or other NSAIDs) are a good additional therapy and recommend trying this in addition to other pain relievers.  -Typically there is a minimal to moderate amount of incisional pain after surgery.  - An ice pack is recommended intermittently for the first 48  hours to help reduce pain & swelling.  -Most patients are provided a prescription for medication to take on a short term basis to help relieve the discomfort.  -If pain medication refills are needed we require you contact our office during regular business hours. (8am-5pm Monday-Friday)  -Please be aware that pain medication can cause constipation.  It may be recommended to take an over the counter stool softener as directed to prevent constipation.     Constipation:  -The pain medication you are prescribed at the time of your surgery can cause constipation.   -We recommend that you take an over the counter stool softener such as colace or senokot-s on a regular basis until you have stopped the pain medication or bowel movements are regular. You make take 1-4 tablets twice per day.   -For constipation lasting 3 days please take Milk of Magnesia or Miralax as directed on the bottles. If you have taken Milk of Magnesia and Miralax and still have not had a bowel movement please contact your our office.    Incision/Wound care:  -Leave your steri-strips in place until your post op visit.   -If you have a clear plastic dressing over a gauze on your incision, remove these 1-2 days after discharging from the hospital.   -You many shower 24hrs after surgery.  It is ok to get the steri-strips/incision wet while showering & pat the area dry with a clean towel  -No bathtubs, hot tubs or swimming is recommended for at least 2 weeks.    Vaginal drainage/spotting:  -Following a hysterectomy you may have vaginal drainage/spotting for up to 4-6 weeks from surgery. If more than a regular period please call our office.     Bladder symptoms:  -You may also have bladder irritation of difficulty starting urination from your surgery and this is normal. Please call if you have pain or burning when you urinate or fevers.     Follow up care:  -You will be asked to see Dr. Ruiz's Nurse Practitioner in 1 week and Dr. Ruiz in 8 weeks.   -If  you don't already have an appointment, please contact the office and our staff will happily assist you in scheduling your appointment. Bring your insurance card & government issued ID card to your appointment.    CALL YOUR SURGEON @[phone] FOR ANY OF THE FOLLOWING:  -Temperature greater than 101 degrees Fahrenheit  -Increased pain  -Increased shortness of breath  -Increased bleeding or drainage or vaginal bleeding greater than a regular menses.   -Pus-like drainage, increasing redness, swelling, tenderness, or warmth at the incision site  -Persistent nausea or vomiting            Discharge Instructions       Discharge instructions following your gynecologic procedure:  Returning to work/activities:  -Typically patients can expect to return to light work within 2-4 weeks.  -No driving or operating machinery while taking prescription pain medication.  -You should avoid heavy lifting until your follow up visit. No lifting greater than 20 pounds for 2 weeks.     Diet:  -as tolerated    Pain:  -Motrin (or other NSAIDs) are a good additional therapy and recommend trying this in addition to other pain relievers.  -Typically there is a minimal to moderate amount of incisional pain after surgery.  - An ice pack is recommended intermittently for the first 48 hours to help reduce pain & swelling.  -Most patients are provided a prescription for medication to take on a short term basis to help relieve the discomfort.  -If pain medication refills are needed we require you contact our office during regular business hours. (8am-5pm Monday-Friday)  -Please be aware that pain medication can cause constipation.  It may be recommended to take an over the counter stool softener as directed to prevent constipation.     Constipation:  -The pain medication you are prescribed at the time of your surgery can cause constipation.   -We recommend that you take an over the counter stool softener such as colace or senokot-s on a regular  basis until you have stopped the pain medication or bowel movements are regular. You make take 1-4 tablets twice per day.   -For constipation lasting 3 days please take Milk of Magnesia or Miralax as directed on the bottles. If you have taken Milk of Magnesia and Miralax and still have not had a bowel movement please contact your our office.    Incision/Wound care:  -Leave your steri-strips in place until your post op visit.   -If you have a clear plastic dressing over a gauze on your incision, remove these 1-2 days after discharging from the hospital.   -You many shower 24hrs after surgery.  It is ok to get the steri-strips/incision wet while showering & pat the area dry with a clean towel  -No bathtubs, hot tubs or swimming is recommended for at least 2 weeks.     Follow up care:  -You will be asked to see Dr. Ruiz's Nurse Practitioner in 1 week and Dr. Ruiz in 8 weeks.   -If you don't already have an appointment, please contact the office and our staff will happily assist you in scheduling your appointment. Bring your insurance card & government issued ID card to your appointment.    CALL YOUR SURGEON @[phone] FOR ANY OF THE FOLLOWING:  -Temperature greater than 101 degrees Fahrenheit  -Increased pain  -Increased shortness of breath  -Increased bleeding or drainage or vaginal bleeding greater than a regular menses.   -Pus-like drainage, increasing redness, swelling, tenderness, or warmth at the incision site  -Persistent nausea or vomiting                  Further instructions from your care team       **If you have questions or concerns about your procedure,   call Dr. Ruiz [phone]**        Pending Results     Date and Time Order Name Status Description    3/30/2017 1537 Cytology non gyn In process     3/30/2017 1512 Surgical pathology exam In process             Statement of Approval     Ordered          03/31/17 0834  I have reviewed and agree with all the recommendations and orders detailed in  "this document.  EFFECTIVE NOW     Approved and electronically signed by:  Debbie Brown APRN CNP             Admission Information     Date & Time Provider Department Dept. Phone    3/30/2017 Vanessa Ruiz MD [place] [address] [facility] [phone]      Your Vitals Were     Blood Pressure Pulse Temperature Respirations Height Weight    107/43 76 96.6  F (35.9  C) (Axillary) 18 1.702 m (5' 7\") 68.5 kg (151 lb)    Pulse Oximetry BMI (Body Mass Index)                91% 23.65 kg/m2          MyChart Information     [facility] lets you send messages to your doctor, view your test results, renew your prescriptions, schedule appointments and more. To sign up, go to [email or web address] . Click on \"Log in\" on the left side of the screen, which will take you to the Welcome page. Then click on \"Sign up Now\" on the right side of the page.     You will be asked to enter the access code listed below, as well as some personal information. Please follow the directions to create your username and password.     Your access code is: [email or web address]  Expires: 2017 12:38 PM     Your access code will  in 90 days. If you need help or a new code, please call your [place] clinic or [phone].        Care EveryWhere ID     This is your Care EveryWhere ID. This could be used by other organizations to access your [facility] medical records  [id number]           Review of your medicines      START taking        Dose / Directions    HYDROcodone-acetaminophen 5-325 MG per tablet   Commonly known as:  NORCO   Used for:  Pelvic mass        Dose:  1-2 tablet   Take 1-2 tablets by mouth every 4 hours as needed for other (Moderate to Severe Pain)   Quantity:  30 tablet   Refills:  0       senna-docusate 8.6-50 MG per tablet   Commonly known as:  SENOKOT-S;PERICOLACE   Used for:  Pelvic mass        Dose:  1-2 tablet   Take 1-2 tablets by mouth 2 times daily as needed for constipation Take while on oral narcotics to " prevent or treat constipation.   Quantity:  30 tablet   Refills:  0         CONTINUE these medicines which have NOT CHANGED        Dose / Directions    ASPIRIN PO        Dose:  162 mg   Take 162 mg by mouth daily (2 x 81 mg = 162 mg dose)   Refills:  0       ATENOLOL PO        Dose:  50 mg   Take 50 mg by mouth every evening   Refills:  0       FUROSEMIDE PO        Dose:  20 mg   Take 20 mg by mouth 2 times daily Morning and Noon   Refills:  0       K-DUR PO        Dose:  20 mEq   Take 20 mEq by mouth daily with food   Refills:  0       LEVOTHYROXINE SODIUM PO        Dose:  75 mcg   Take 75 mcg by mouth daily   Refills:  0       miconazole 2 % powder   Commonly known as:  MICATIN; MICRO GUARD   Used for:  Rash        Apply topically every hour as needed for other (topical candidiasis)   Quantity:  10 g   Refills:  0       Multi-vitamin Tabs tablet        Dose:  1 tablet   Take 1 tablet by mouth daily   Refills:  0       SIMVASTATIN PO        Dose:  20 mg   Take 20 mg by mouth every evening (0.5 x 40 mg tablet = 20 mg dose)   Refills:  0       TYLENOL PO        Dose:  1300 mg   Take 1,300 mg by mouth 2 times daily as needed for mild pain or fever   Refills:  0       VITAMIN D (CHOLECALCIFEROL) PO        Dose:  2000 Units   Take 2,000 Units by mouth daily   Refills:  0            Where to get your medicines      Some of these will need a paper prescription and others can be bought over the counter. Ask your nurse if you have questions.     Bring a paper prescription for each of these medications     HYDROcodone-acetaminophen 5-325 MG per tablet    senna-docusate 8.6-50 MG per tablet                Protect others around you: Learn how to safely use, store and throw away your medicines at www.disposemymeds.org.             Medication List: This is a list of all your medications and when to take them. Check marks below indicate your daily home schedule. Keep this list as a reference.      Medications           Morning  Afternoon Evening Bedtime As Needed    ASPIRIN PO   Take 162 mg by mouth daily (2 x 81 mg = 162 mg dose)   Next Dose Due:  Have not taken in hospital, resume home schedule.                                   ATENOLOL PO   Take 50 mg by mouth every evening   Next Dose Due:  Have not taken in hospital, resume home schedule.                                   FUROSEMIDE PO   Take 20 mg by mouth 2 times daily Morning and Noon   Next Dose Due:  Have not taken in hospital, resume home schedule.                                      HYDROcodone-acetaminophen 5-325 MG per tablet   Commonly known as:  NORCO   Take 1-2 tablets by mouth every 4 hours as needed for other (Moderate to Severe Pain)   Last time this was given:  1 tablet on 3/31/2017  6:58 AM   Next Dose Due:  11:00 as needed.                            May take 1-2 tablets every 4 hours for moderate to severe pain.       K-DUR PO   Take 20 mEq by mouth daily with food   Next Dose Due:  Have not taken in hospital, resume home schedule.                                   LEVOTHYROXINE SODIUM PO   Take 75 mcg by mouth daily   Next Dose Due:  Have not taken in hospital, resume home schedule.                                   miconazole 2 % powder   Commonly known as:  MICATIN; MICRO GUARD   Apply topically every hour as needed for other (topical candidiasis)   Next Dose Due:  Have not taken in hospital, resume home schedule.                                   Multi-vitamin Tabs tablet   Take 1 tablet by mouth daily   Next Dose Due:  Have not taken in hospital, resume home schedule.                                   senna-docusate 8.6-50 MG per tablet   Commonly known as:  SENOKOT-S;PERICOLACE   Take 1-2 tablets by mouth 2 times daily as needed for constipation Take while on oral narcotics to prevent or treat constipation.   Next Dose Due:  Have not taken in hospital, resume home schedule.                            May take 1-2 tablets, twice per day for constipation while  on oral narcotics (Norco).       SIMVASTATIN PO   Take 20 mg by mouth every evening (0.5 x 40 mg tablet = 20 mg dose)   Next Dose Due:  Have not taken in hospital, resume home schedule.                                   TYLENOL PO   Take 1,300 mg by mouth 2 times daily as needed for mild pain or fever   Next Dose Due:  Have not taken in hospital, resume home schedule.                                   VITAMIN D (CHOLECALCIFEROL) PO   Take 2,000 Units by mouth daily   Next Dose Due:  Have not taken in hospital, resume home schedule.

## 2017-03-30 NOTE — ANESTHESIA CARE TRANSFER NOTE
Patient: Jovita Marshall    Procedure(s):  ROBOTIC ASSISTED BILATERAL SALPINGO-OOPHORECTOMY, WASHINGS, LYSIS OF ADHESIONS     - Wound Class: II-Clean Contaminated   - Wound Class: II-Clean Contaminated   - Wound Class: I-Clean    Diagnosis: PELVIC MASS  Diagnosis Additional Information: No value filed.    Anesthesia Type:   General, ETT     Note:  Airway :ETT  Patient transferred to:PACU  Comments: Patient remains intubated d/t ET CO2 ~70's with noted crepitus across chest and around neck. Pt to PACU, ett in place, ambu with 10L O2 for transport, placed on vent in PACU  Patent IV  All monitors and alarms on.  VSS  Report and transfer of care to RN.      Vitals: (Last set prior to Anesthesia Care Transfer)    CRNA VITALS  3/30/2017 1547 - 3/30/2017 1620      3/30/2017             Resp Rate (set): 10                Electronically Signed By: GLEN Goodson CRNA  March 30, 2017  4:20 PM

## 2017-03-30 NOTE — ANESTHESIA PREPROCEDURE EVALUATION
Anesthesia Evaluation     . Pt has had prior anesthetic.     No history of anesthetic complications          ROS/MED HX    ENT/Pulmonary:      (-) sleep apnea   Neurologic:       Cardiovascular:     (+) Dyslipidemia, hypertension----. : . . . :. valvular problems/murmurs type: AS .      Congenital heart disease: mild AS, valve area 1.6.   METS/Exercise Tolerance:     Hematologic:         Musculoskeletal:         GI/Hepatic:        (-) GERD   Renal/Genitourinary:         Endo:     (+) thyroid problem Obesity, .      Psychiatric:         Infectious Disease:         Malignancy:         Other: Comment: Pelvic mass 15 cm  Ascites, 4 l drained 1 week ago                    Physical Exam  Normal systems: dental    Airway   Mallampati: III  TM distance: >3 FB  Neck ROM: full    Dental     Cardiovascular   Rhythm and rate: regular      Pulmonary                     Anesthesia Plan      History & Physical Review  History and physical reviewed and following examination; no interval change.    ASA Status:  3 .        Plan for General and ETT with Intravenous induction. Maintenance will be Balanced.    PONV prophylaxis:  Ondansetron (or other 5HT-3) and Dexamethasone or Solumedrol  Additional equipment: Videolaryngoscope and 2nd IV      Postoperative Care  Postoperative pain management:  IV analgesics.      Consents  Anesthetic plan, risks, benefits and alternatives discussed with:  Patient..                          .

## 2017-03-31 VITALS
HEART RATE: 76 BPM | BODY MASS INDEX: 23.7 KG/M2 | DIASTOLIC BLOOD PRESSURE: 43 MMHG | RESPIRATION RATE: 18 BRPM | SYSTOLIC BLOOD PRESSURE: 107 MMHG | OXYGEN SATURATION: 91 % | HEIGHT: 67 IN | WEIGHT: 151 LBS | TEMPERATURE: 96.6 F

## 2017-03-31 PROBLEM — R09.02 HYPOXIA: Status: ACTIVE | Noted: 2017-03-31

## 2017-03-31 PROCEDURE — G0378 HOSPITAL OBSERVATION PER HR: HCPCS

## 2017-03-31 PROCEDURE — 25000132 ZZH RX MED GY IP 250 OP 250 PS 637: Mod: GY | Performed by: NURSE PRACTITIONER

## 2017-03-31 PROCEDURE — 25000132 ZZH RX MED GY IP 250 OP 250 PS 637: Mod: GY | Performed by: OBSTETRICS & GYNECOLOGY

## 2017-03-31 PROCEDURE — A9270 NON-COVERED ITEM OR SERVICE: HCPCS | Mod: GY | Performed by: NURSE PRACTITIONER

## 2017-03-31 PROCEDURE — 25000128 H RX IP 250 OP 636: Performed by: OBSTETRICS & GYNECOLOGY

## 2017-03-31 RX ADMIN — HYDROCODONE BITARTRATE AND ACETAMINOPHEN 1 TABLET: 5; 325 TABLET ORAL at 00:09

## 2017-03-31 RX ADMIN — Medication 2 LOZENGE: at 00:08

## 2017-03-31 RX ADMIN — SODIUM CHLORIDE 500 ML: 9 INJECTION, SOLUTION INTRAVENOUS at 05:13

## 2017-03-31 RX ADMIN — HYDROCODONE BITARTRATE AND ACETAMINOPHEN 1 TABLET: 5; 325 TABLET ORAL at 10:44

## 2017-03-31 RX ADMIN — HYDROCODONE BITARTRATE AND ACETAMINOPHEN 1 TABLET: 5; 325 TABLET ORAL at 06:58

## 2017-03-31 NOTE — PROGRESS NOTES
Care Coordination:    Noted pt was admitted to an outpatient - in a bed 3/30 at 2218, and to observation on 3/31 at 0719.   Met with patient in room, introduced self and role 03/31/17 at 10:14 AM  Outpatient / Observation brochure provided to patient and explained.    Pt denies having any questions.      Pt has discharge orders and her daughter is on her way to pick pt up.      Sandy Garzon RN, BSN  [facility] Care Coordinator   Mobile Phone: [phone]

## 2017-03-31 NOTE — OR NURSING
Oseguera catheter placed using sterile technique. Immediate return of clear, yellow urine. Pt. tolerated without difficulty.

## 2017-03-31 NOTE — PROGRESS NOTES
Doing well this AM.  Has voided.  Pain controlled.  Breathing well.  Home today.  Discharge instructions reviewed.    ZION Ruiz MD

## 2017-03-31 NOTE — PLAN OF CARE
Problem: Goal Outcome Summary  Goal: Goal Outcome Summary  Outcome: No Change  A&Ox4. VSS. C/o R abd pain, given Norco. Denies nausea/SOB. PIV SL. Lap sites WDL, no drainage; abd binder in place. Mild generalized edema, moderate BLE edema; elevated. BS hypoactive, no gas. A1 + walker when up. Regular diet. Repositioned q 2. Incontinent at times. Has not voided since marino removal at 2030, bladder scanned twice for under 200. No pressure/urge to void. Attempted to void in BR without success. MD notified, given bolus; PO intake encouraged. 0700 bladder scanned 254, up to void for 100. Pt appeared to rest comfortably throughout the night. Will continue to monitor. Plan: possible d/c this am

## 2017-03-31 NOTE — OP NOTE
PROCEDURE/SERVICE DATE:  03/30/2017.      PREOPERATIVE DIAGNOSES:     1. Pelvic mass.   2. Ascites.      POSTOPERATIVE DIAGNOSES:     1. Ruptured right ovarian tumor.   2. Sebaceous ascites.   3. Abdominal adhesions.      PROCEDURES:  Robotic-assisted laparoscopic lysis of adhesions and bilateral salpingo-oophorectomy as well as washings.      INDICATIONS FOR THE PROCEDURE:  Jovita Marshall is an 82-year-old female who was admitted to [facility] on 03/19/2017 with severe abdominal pain.  The abdominal pain had begun in January and was progressively worsening over time.  CT scan done on admission revealed a 15 cm pelvic primarily cystic mass, but with some lipids components and some calcifications that could be a dermoid.  There was also a large amount of ascites.  CA-125 was slightly elevated at 95 units/mL.  CA 27-29 was 297.  She underwent a paracentesis of 4 liters of brownish fluid, and pathology was negative for malignancy.  There were inflammatory changes seen.  Pelvic ultrasound revealed the uterus to be surgically absent.  Neither ovary was identified.  There was a 16.7x16.9x15.6 complex cystic lesion identified the mid pelvis, corresponding to the abnormality on CT scan, and a moderate amount of ascites and free fluid.  The patient had a definite improvement in her symptoms within 24 hours, and she was discharged with outpatient plan for robotic BSO and possible staging.      FINDINGS:  On entering the abdomen, the patient was found to have creamy brown fluid consistent with the contents of a dermoid cyst.  There was sebaceous material and fatty material floating within this fluid.  There were some particulates.  There was a generalized inflammatory response in the pelvis with the omentum stuck up to the anterior abdominal wall all the way down to the pelvis.  The mass was ruptured and sac like.  It was replacing the right ovary.  The left tube and ovary were somewhat adherent to the rectosigmoid on the  left pelvic sidewall.      PROCEDURE IN DETAIL:  The patient was taken to the operating room.  Pneumatic compression stockings were placed on her lower extremities.  She was placed in the supine position on a pink pad.  General endotracheal anesthesia was administered in the usual fashion.  Once intubated, she was repositioned in a low lithotomy position using well-padded Mateo stirrups.  Her arms were padded and held at her side with a combination of a draw sheet and sleds.  Shoulder braces were applied to her shoulders.  A Castro was placed above her forehead, and her face was protected with a foam ring.  She was prepped and draped in the usual sterile fashion.  A timeout was conducted, and everyone agreed upon the procedure.        I started by infiltrating the supraumbilical area with 0.5% Marcaine with epinephrine at approximately 28 cm above the symphysis pubis.  I could feel that she had ballottable fluid present.  I made an incision and introduced the Veress needle.  Opening pressure was 4 mmHg.  The abdomen was insufflated with carbon dioxide to create a diffuse pneumoperitoneum.  We exchanged the Veress for an 8 mm trocar.  The camera was introduced, and we could see that there was a large amount of browny sebaceous material in the abdomen.  We then placed 2 additional ports 8 cm to the right and left of the camera port in the upper abdomen.  Through this, we were able to introduce the suction  and were able to irrigate a lot of this out.        At that point, I could see that the omentum was stuck to the anterior abdominal wall and we had gone through the omentum.  In order to take the omentum down, I needed to make a separate 5 mm incision at the left costal margin.  Through this, I was able to introduce a 5 mm camera, and we were able to use the suction  in a tunnel along the left side that I had created already to take down the filmy adhesions of the omentum to the anterior abdominal  wall.  Once this was completed, we made sure that the trocars were all released from the omentum, and they were.  We then placed an additional 8 mm trocar 16 cm to the left of the camera port.  The patient was then placed in steep Trendelenburg, and a 15 mm port was placed above the right anterior superior iliac spine.  We used 2 insufflators during the case and kept the pressure limits at approximately 13 mmHg.        I started on the right-hand side.  I opened up the posterior broad ligament and divided the residual round ligament on this side.  I dissected out the retroperitoneal space.  I dissected the ureter off the medial aspect of the posterior broad ligament peritoneum, which was fairly easy.  We created a defect above the ureter and the clear space of the thickened peritoneum and isolated the ovarian vessels.  Once they were isolated, they were cauterized with the Maryland bipolar.  I did forget to say that we docked the robot in the standard fashion.  Monopolar scissors were placed in the right upper robotic arm, a Maryland bipolar in the medial left upper robotic arm and a ProGrasp in the lateral left robotic arm.  Once we had cauterized the vessels with the [place] bipolar, we divided it with the scissors.  We divided the peritoneum and isolated the right pelvic mass to peritoneal attachments near the bladder, which were again cauterized and divided.        We then went to the left-hand side and again opened up the posterior broad ligament peritoneum.  We could see that the ovary and tube were somewhat stuck to the colon.  I worked the colon off these structures, peeled the ovary off the sigmoid colon by sharp dissection and then elevated it on its own blood supply.  Again, the Maryland bipolar was used to cauterize the ovarian blood vessels, and the scissors were used to divide it.  We removed the left tube and ovary in an EndoCatch bag.  The right tube and ovary, because it was so large, we actually  brought right through the 15 mm incision with a Kocher and eased the bag out of the sac that had contained the contents out of the incision.  We had to irrigate out the incision, and there were a few hair clumps present in that drainage from the cyst consistent with a dermoid cyst.  The pathologist did call back stating that she was concerned about a possible atypical or borderline Nik tumor, but clinically this definitely appeared to be more of a dermoid, but we will have to wait final pathology given the large size and rupture of the cyst.        We closed the peritoneum and muscle of the 15 mm trocar using interrupted 0 Vicryl sutures in a figure-of-eight fashion using the robotic instruments.  The trocar was then removed.  We undocked the robot, and the robot was removed from the table.  We switched the camera to the upper abdomen and suctioned out any remaining fluid around the liver and spleen and irrigated copiously.  I assume she had approximately 3 liters of fluid remaining in her abdomen from the rupture of the cyst.  We removed the trocars.  We closed the incisions with 4-0 Monocryl in an interrupted fashion to reapproximate the subcutaneous tissue and 4-0 Monocryl in a subcuticular fashion to reapproximate the skin.  The right lower quadrant incision needed more tension.  We irrigated this out further, and we closed the fascial defect with a 0 Vicryl suture in a figure-of-eight fashion.  The subcutaneous tissue was reapproximated with 2-0 Vicryl suture and then eventually 4-0 Monocryl suture, and the skin was reapproximated with 4-0 Monocryl in a subcuticular fashion.  Mastisol was placed around the incisions and Steri-Strips laid over the incisions.  The patient's anesthesia was reversed.  She was extubated and taken to the Recovery Room in stable condition.      Debbie Brown was my primary assist.  She helped with trocar placement with docking of the robot and initial placement of the robotic  instruments.  She helped with assisting through the accessory port and helped with removal of the mass and eventual undocking the robot and port site closure.         MARIA LUISA CAPUTO MD             D: 2017 16:24   T: 2017 11:06   MT: [id number]      Name:     KATINA SORTO   MRN:      [id number]        Account:        [id number]   :      1934           Procedure Date: 2017      Document: [id number]

## 2017-03-31 NOTE — PLAN OF CARE
Problem: Goal Outcome Summary  Goal: Goal Outcome Summary  Outcome: No Change  Plans for DC: home 3/31  Discharge meds in locked drawer     DO NOT REMOVE ANY FIELDS BELOW - TYPE N/A OR WNL IF NO INFO TO ADD  Procedure/POD: 3/30 robotic assisted laparscopic bilateral salpingo-oophrectomy and lysis of lesions    Cyst removed; filled with 2L fluid; will take >1 week for pathology results  Neuro/Psych/Sleep: AO; pleasant  CV/Tele/Abnormal VS: WDL  Resp: RA; continuous pulse ox  Skin/Wound: 5 sites- steri stripes; abd binder  /GI/Diet: Regular diet; tolerating clear liquids; not passing gas  IV: L PIV saline locked  Pain: denies; Norco and dilaudid available  Chemo/Radiation:  Abnormal Labs/Glucose:  Activity/Safety:   Consults:   Education: room orientation; IS

## 2017-03-31 NOTE — PROVIDER NOTIFICATION
MD Notification    Notified Person:  MD    Notified Persons Name: Dr. Ruiz    Notification Date/Time: March 31, 2017 0500    Notification Interaction:  Talked with Physician    Purpose of Notification: Oseguera removed after surgery at 2030, no UOP since. Bladder scanned twice for amounts under 200. Pt does not feel any pressure/urge to void. Was up once to BR to try voiding, but unable.    Orders Received: 500 cc bolus over an hour.    Comments:

## 2017-03-31 NOTE — OR NURSING
Patient extubated by Dr. Baig. Small amount of clear sputum suctioned from throat. Bilateral breath sounds auscultated. No stridor noted. Pt able to speak and cough. Now on 7L via simple face mask. O2 sat 100%.

## 2017-03-31 NOTE — ANESTHESIA POSTPROCEDURE EVALUATION
Patient: Jovita Marshall    Procedure(s):  ROBOTIC ASSISTED BILATERAL SALPINGO-OOPHORECTOMY, WASHINGS, LYSIS OF ADHESIONS     - Wound Class: II-Clean Contaminated   - Wound Class: II-Clean Contaminated   - Wound Class: I-Clean    Diagnosis:PELVIC MASS  Diagnosis Additional Information: No value filed.    Anesthesia Type:  General, ETT    Note:  Anesthesia Post Evaluation    Patient location during evaluation: PACU  Patient participation: Able to fully participate in evaluation  Level of consciousness: awake  Pain management: adequate  Airway patency: patent  Cardiovascular status: acceptable  Respiratory status: acceptable  Hydration status: acceptable  PONV: none     Anesthetic complications: None    Comments: Pt with diffuse thoracic crepitus noticed at the end of the procedure, along with hypercapnia, therefore kept intubated and ventilated with higher minute ventilation for some time.  ABG checked and OK, pt did well on pressure support trial, therefore propofol turned off and the patient was extubated in the usual fashion without complication.  She did quite well from that point until time of transfer to floor.          Last vitals:  Vitals:    03/30/17 2311 03/30/17 2340 03/31/17 0009   BP: 117/53 126/48    Pulse:  76    Resp: 18 19 17   Temp:  36.3  C (97.3  F)    SpO2: 93% 92%          Electronically Signed By: Regis Baig MD  March 31, 2017  12:51 AM

## 2017-04-02 LAB — INTERPRETATION ECG - MUSE: NORMAL

## 2017-04-04 LAB
COPATH REPORT: NORMAL
COPATH REPORT: NORMAL

## 2017-04-07 ENCOUNTER — DOCUMENTATION ONLY (OUTPATIENT)
Dept: CARE COORDINATION | Facility: CLINIC | Age: 82
End: 2017-04-07

## 2017-04-07 NOTE — PROGRESS NOTES
[facility] now requests orders and shares plan of care/discharge summaries for some patients through [employer].  Please REPLY TO THIS MESSAGE in order to give authorization for orders when needed.  This is considered a verbal order, you will still receive a faxed copy of orders for signature.  Thank you for your assistance in improving collaboration for our patients.    ORDER  OT lymphedema therapy 2w6    MD SUMMARY/PLAN OF CARE  Plan to reduce BLE and abdominal edema, fit into compression garment and provide long term management education

## 2024-04-06 NOTE — PROGRESS NOTES
[facility]  Patient is currently open to home care services with [facility].  The patient is currently receiving RN/PT/OT/SW services.  [facility]  and team have been notified that patient is under OBSERVATION STATUS. [facility] liaison will continue to follow patient during stay.  If patient is admitted to inpatient status please provide orders to resume home care at time of discharge if appropriate.   Airway patent

## (undated) DEVICE — DAVINCI XI GRASPER ENDOWRIST PROGRASP 470093

## (undated) DEVICE — SU MONOCRYL 4-0 PS-2 18" UND Y496G

## (undated) DEVICE — ENDO TROCAR OPTICAL ACCESS KII Z-THRD 15X100MM C0R37

## (undated) DEVICE — SPONGE LAP 18X18" X8435

## (undated) DEVICE — PACK DAVINCI GYN SMA15GDFS1

## (undated) DEVICE — SOL NACL 0.9% IRRIG 1000ML BOTTLE 07138-09

## (undated) DEVICE — DAVINCI XI SEAL UNIVERSAL 5-8MM 470361

## (undated) DEVICE — DRSG STERI STRIP 1X5" R1548

## (undated) DEVICE — DRAPE CV SPLIT 110X36" 89452

## (undated) DEVICE — ENDO TROCAR FIRST ENTRY KII FIOS Z-THRD 05X100MM CTF03

## (undated) DEVICE — SPONGE RAY-TEC 4X4" 7317

## (undated) DEVICE — Device

## (undated) DEVICE — DAVINCI XI DRAPE COLUMN 470341

## (undated) DEVICE — GLOVE PROTEXIS MICRO 6.5  2D73PM65

## (undated) DEVICE — LINEN TOWEL PACK X5 5464

## (undated) DEVICE — GLOVE PROTEXIS W/NEU-THERA 6.5  2D73TE65

## (undated) DEVICE — DAVINCI HOT SHEARS TIP COVER  400180

## (undated) DEVICE — DAVINCI TROCAR 8MM BLADELESS 470357

## (undated) DEVICE — DAVINCI XI MONOPOLAR SCISSORS HOT SHEARS 8MM 470179

## (undated) DEVICE — SU VICRYL 0 CT-2 27" J334H

## (undated) DEVICE — NDL INSUFFLATION 120MM VERRES 172015

## (undated) DEVICE — SOL NACL 0.9% INJ 1000ML BAG 2B1324X

## (undated) DEVICE — GLOVE BIOGEL PI ULTRATOUCH G SZ 6.5 42165

## (undated) DEVICE — DAVINCI XI DRAPE ARM 470015

## (undated) DEVICE — DAVINCI XI ESU FCP BIPOLAR MARYLAND 470172

## (undated) DEVICE — GLOVE PROTEXIS BLUE W/NEU-THERA 7.0  2D73EB70

## (undated) DEVICE — EVAC SYSTEM CLEAR FLOW SC082500

## (undated) DEVICE — ESU GROUND PAD UNIVERSAL W/O CORD

## (undated) DEVICE — ENDO TROCAR CONMED AIRSEAL BLADELESS 08X120MM IAS8-120LP

## (undated) DEVICE — DAVINCI XI NDL DRIVER LARGE 470006

## (undated) DEVICE — SUCTION CANISTER MEDIVAC LINER 3000ML W/LID 65651-530

## (undated) DEVICE — TUBING SUCTION 12"X1/4" N612

## (undated) RX ORDER — DEXAMETHASONE SODIUM PHOSPHATE 4 MG/ML
INJECTION, SOLUTION INTRA-ARTICULAR; INTRALESIONAL; INTRAMUSCULAR; INTRAVENOUS; SOFT TISSUE
Status: DISPENSED
Start: 2017-03-30

## (undated) RX ORDER — ALBUMIN, HUMAN INJ 5% 5 %
SOLUTION INTRAVENOUS
Status: DISPENSED
Start: 2017-03-30

## (undated) RX ORDER — ONDANSETRON 2 MG/ML
INJECTION INTRAMUSCULAR; INTRAVENOUS
Status: DISPENSED
Start: 2017-03-30

## (undated) RX ORDER — FENTANYL CITRATE 50 UG/ML
INJECTION, SOLUTION INTRAMUSCULAR; INTRAVENOUS
Status: DISPENSED
Start: 2017-03-30

## (undated) RX ORDER — GLYCOPYRROLATE 0.2 MG/ML
INJECTION, SOLUTION INTRAMUSCULAR; INTRAVENOUS
Status: DISPENSED
Start: 2017-03-30

## (undated) RX ORDER — PROPOFOL 10 MG/ML
INJECTION, EMULSION INTRAVENOUS
Status: DISPENSED
Start: 2017-03-30

## (undated) RX ORDER — BUPIVACAINE HYDROCHLORIDE AND EPINEPHRINE 5; 5 MG/ML; UG/ML
INJECTION, SOLUTION EPIDURAL; INTRACAUDAL; PERINEURAL
Status: DISPENSED
Start: 2017-03-30